# Patient Record
Sex: FEMALE | Race: WHITE | Employment: UNEMPLOYED | URBAN - METROPOLITAN AREA
[De-identification: names, ages, dates, MRNs, and addresses within clinical notes are randomized per-mention and may not be internally consistent; named-entity substitution may affect disease eponyms.]

---

## 2019-09-24 ENCOUNTER — APPOINTMENT (OUTPATIENT)
Dept: CT IMAGING | Facility: CLINIC | Age: 49
End: 2019-09-24
Payer: MEDICARE

## 2019-09-24 ENCOUNTER — HOSPITAL ENCOUNTER (EMERGENCY)
Facility: CLINIC | Age: 49
Discharge: HOME | End: 2019-09-24
Attending: EMERGENCY MEDICINE
Payer: MEDICARE

## 2019-09-24 VITALS
SYSTOLIC BLOOD PRESSURE: 154 MMHG | HEART RATE: 87 BPM | OXYGEN SATURATION: 99 % | DIASTOLIC BLOOD PRESSURE: 80 MMHG | WEIGHT: 165 LBS | TEMPERATURE: 98.3 F | RESPIRATION RATE: 14 BRPM

## 2019-09-24 DIAGNOSIS — R10.32 LEFT LOWER QUADRANT PAIN: Primary | ICD-10-CM

## 2019-09-24 LAB
-: ABNORMAL
ABSOLUTE EOS #: 0.1 K/UL (ref 0–0.4)
ABSOLUTE IMMATURE GRANULOCYTE: NORMAL K/UL (ref 0–0.3)
ABSOLUTE LYMPH #: 1.7 K/UL (ref 1–4.8)
ABSOLUTE MONO #: 0.4 K/UL (ref 0.1–1.2)
AMORPHOUS: ABNORMAL
ANION GAP SERPL CALCULATED.3IONS-SCNC: 12 MMOL/L (ref 9–17)
BACTERIA: ABNORMAL
BASOPHILS # BLD: 2 % (ref 0–2)
BASOPHILS ABSOLUTE: 0.1 K/UL (ref 0–0.2)
BILIRUBIN URINE: NEGATIVE
BUN BLDV-MCNC: 17 MG/DL (ref 6–20)
BUN/CREAT BLD: ABNORMAL (ref 9–20)
CALCIUM SERPL-MCNC: 9.1 MG/DL (ref 8.6–10.4)
CASTS UA: ABNORMAL /LPF (ref 0–2)
CHLORIDE BLD-SCNC: 106 MMOL/L (ref 98–107)
CO2: 23 MMOL/L (ref 20–31)
COLOR: YELLOW
COMMENT UA: ABNORMAL
CREAT SERPL-MCNC: 0.8 MG/DL (ref 0.5–0.9)
CRYSTALS, UA: ABNORMAL /HPF
DIFFERENTIAL TYPE: NORMAL
EOSINOPHILS RELATIVE PERCENT: 2 % (ref 1–4)
EPITHELIAL CELLS UA: ABNORMAL /HPF (ref 0–5)
GFR AFRICAN AMERICAN: >60 ML/MIN
GFR NON-AFRICAN AMERICAN: >60 ML/MIN
GFR SERPL CREATININE-BSD FRML MDRD: ABNORMAL ML/MIN/{1.73_M2}
GFR SERPL CREATININE-BSD FRML MDRD: ABNORMAL ML/MIN/{1.73_M2}
GLUCOSE BLD-MCNC: 120 MG/DL (ref 70–99)
GLUCOSE URINE: NEGATIVE
HCG(URINE) PREGNANCY TEST: NEGATIVE
HCT VFR BLD CALC: 40.8 % (ref 36–46)
HEMOGLOBIN: 13.7 G/DL (ref 12–16)
IMMATURE GRANULOCYTES: NORMAL %
KETONES, URINE: NEGATIVE
LEUKOCYTE ESTERASE, URINE: NEGATIVE
LYMPHOCYTES # BLD: 30 % (ref 24–44)
MCH RBC QN AUTO: 31.2 PG (ref 26–34)
MCHC RBC AUTO-ENTMCNC: 33.5 G/DL (ref 31–37)
MCV RBC AUTO: 93 FL (ref 80–100)
MONOCYTES # BLD: 6 % (ref 2–11)
MUCUS: ABNORMAL
NITRITE, URINE: NEGATIVE
NRBC AUTOMATED: NORMAL PER 100 WBC
OTHER OBSERVATIONS UA: ABNORMAL
PDW BLD-RTO: 13.1 % (ref 12.5–15.4)
PH UA: 5 (ref 5–8)
PLATELET # BLD: 273 K/UL (ref 140–450)
PLATELET ESTIMATE: NORMAL
PMV BLD AUTO: 6.7 FL (ref 6–12)
POTASSIUM SERPL-SCNC: 4 MMOL/L (ref 3.7–5.3)
PROTEIN UA: NEGATIVE
RBC # BLD: 4.39 M/UL (ref 4–5.2)
RBC # BLD: NORMAL 10*6/UL
RBC UA: ABNORMAL /HPF (ref 0–2)
RENAL EPITHELIAL, UA: ABNORMAL /HPF
SEG NEUTROPHILS: 60 % (ref 36–66)
SEGMENTED NEUTROPHILS ABSOLUTE COUNT: 3.4 K/UL (ref 1.8–7.7)
SODIUM BLD-SCNC: 141 MMOL/L (ref 135–144)
SPECIFIC GRAVITY UA: 1.03 (ref 1–1.03)
TRICHOMONAS: ABNORMAL
TURBIDITY: CLEAR
URINE HGB: ABNORMAL
UROBILINOGEN, URINE: NORMAL
WBC # BLD: 5.7 K/UL (ref 3.5–11)
WBC # BLD: NORMAL 10*3/UL
WBC UA: ABNORMAL /HPF (ref 0–5)
YEAST: ABNORMAL

## 2019-09-24 PROCEDURE — 81025 URINE PREGNANCY TEST: CPT

## 2019-09-24 PROCEDURE — 80048 BASIC METABOLIC PNL TOTAL CA: CPT

## 2019-09-24 PROCEDURE — 74176 CT ABD & PELVIS W/O CONTRAST: CPT

## 2019-09-24 PROCEDURE — 96372 THER/PROPH/DIAG INJ SC/IM: CPT

## 2019-09-24 PROCEDURE — 6360000002 HC RX W HCPCS: Performed by: EMERGENCY MEDICINE

## 2019-09-24 PROCEDURE — 99284 EMERGENCY DEPT VISIT MOD MDM: CPT

## 2019-09-24 PROCEDURE — 81001 URINALYSIS AUTO W/SCOPE: CPT

## 2019-09-24 PROCEDURE — 36415 COLL VENOUS BLD VENIPUNCTURE: CPT

## 2019-09-24 PROCEDURE — 85025 COMPLETE CBC W/AUTO DIFF WBC: CPT

## 2019-09-24 RX ORDER — KETOROLAC TROMETHAMINE 30 MG/ML
30 INJECTION, SOLUTION INTRAMUSCULAR; INTRAVENOUS ONCE
Status: COMPLETED | OUTPATIENT
Start: 2019-09-24 | End: 2019-09-24

## 2019-09-24 RX ADMIN — KETOROLAC TROMETHAMINE 30 MG: 30 INJECTION, SOLUTION INTRAMUSCULAR; INTRAVENOUS at 20:11

## 2019-09-24 ASSESSMENT — PAIN DESCRIPTION - LOCATION: LOCATION: ABDOMEN

## 2019-09-24 ASSESSMENT — PAIN DESCRIPTION - ORIENTATION: ORIENTATION: LEFT;LOWER

## 2019-09-24 ASSESSMENT — PAIN SCALES - GENERAL
PAINLEVEL_OUTOF10: 6
PAINLEVEL_OUTOF10: 7

## 2019-09-24 ASSESSMENT — PAIN DESCRIPTION - FREQUENCY: FREQUENCY: CONTINUOUS

## 2019-09-24 ASSESSMENT — PAIN DESCRIPTION - PAIN TYPE: TYPE: ACUTE PAIN

## 2019-09-25 NOTE — ED PROVIDER NOTES
quadrant. No rebound, guarding, masses or bruits. Good active bowel sounds, nonsurgical exam    DIFFERENTIAL DIAGNOSIS/ MDM:     , Bowel obstruction, constipation, kidney stone, diverticulitis, ovarian cyst    DIAGNOSTIC RESULTS       RADIOLOGY:   I directly visualized the following  images and reviewed the radiologist interpretations:  CT ABDOMEN PELVIS WO CONTRAST   Final Result   Limited exam without contrast      No acute abnormality in the abdomen or the pelvis. LABS:  Labs Reviewed   BASIC METABOLIC PANEL - Abnormal; Notable for the following components:       Result Value    Glucose 120 (*)     All other components within normal limits   URINALYSIS - Abnormal; Notable for the following components:    Urine Hgb MODERATE (*)     All other components within normal limits   MICROSCOPIC URINALYSIS - Abnormal; Notable for the following components:    Bacteria, UA FEW (*)     Other Observations UA   (*)     Value: Utilizing a urinalysis as the only screening method to exclude a potential uropathogen can be unreliable in many patient populations. Rapid screening tests are less sensitive than culture and if UTI is a clinical possibility, culture should be considered despite a negative urinalysis. All other components within normal limits   CBC WITH AUTO DIFFERENTIAL   PREGNANCY, URINE         EMERGENCY DEPARTMENT COURSE:   Vitals:    Vitals:    09/24/19 1946   BP: (!) 154/80   Pulse: 87   Resp: 14   Temp: 98.3 °F (36.8 °C)   TempSrc: Oral   SpO2: 99%   Weight: 74.8 kg (165 lb)     -------------------------  BP: (!) 154/80, Temp: 98.3 °F (36.8 °C), Pulse: 87, Resp: 14    Orders Placed This Encounter   Medications    ketorolac (TORADOL) injection 30 mg           Re-evaluation Notes    Labs, urine and CT are unremarkable. Patient is reevaluated. She still remained with a nonsurgical abdomen at this time. She will be discharged with early follow-up.   Return if worse        FINAL IMPRESSION      1.

## 2020-07-02 ENCOUNTER — HOSPITAL ENCOUNTER (EMERGENCY)
Facility: CLINIC | Age: 50
Discharge: HOME | End: 2020-07-02
Attending: EMERGENCY MEDICINE

## 2020-07-02 ENCOUNTER — APPOINTMENT (OUTPATIENT)
Dept: GENERAL RADIOLOGY | Facility: CLINIC | Age: 50
End: 2020-07-02

## 2020-07-02 VITALS
BODY MASS INDEX: 27.32 KG/M2 | HEIGHT: 66 IN | HEART RATE: 70 BPM | OXYGEN SATURATION: 100 % | RESPIRATION RATE: 16 BRPM | TEMPERATURE: 97.6 F | WEIGHT: 170 LBS | DIASTOLIC BLOOD PRESSURE: 77 MMHG | SYSTOLIC BLOOD PRESSURE: 116 MMHG

## 2020-07-02 PROCEDURE — 73610 X-RAY EXAM OF ANKLE: CPT

## 2020-07-02 PROCEDURE — 73630 X-RAY EXAM OF FOOT: CPT

## 2020-07-02 PROCEDURE — 6370000000 HC RX 637 (ALT 250 FOR IP): Performed by: EMERGENCY MEDICINE

## 2020-07-02 PROCEDURE — 99284 EMERGENCY DEPT VISIT MOD MDM: CPT

## 2020-07-02 RX ORDER — OXYCODONE HYDROCHLORIDE AND ACETAMINOPHEN 5; 325 MG/1; MG/1
1 TABLET ORAL EVERY 6 HOURS PRN
Qty: 12 TABLET | Refills: 0 | Status: SHIPPED | OUTPATIENT
Start: 2020-07-02 | End: 2020-07-05

## 2020-07-02 RX ORDER — OXYCODONE HYDROCHLORIDE AND ACETAMINOPHEN 5; 325 MG/1; MG/1
2 TABLET ORAL ONCE
Status: COMPLETED | OUTPATIENT
Start: 2020-07-02 | End: 2020-07-02

## 2020-07-02 RX ADMIN — OXYCODONE AND ACETAMINOPHEN 2 TABLET: 5; 325 TABLET ORAL at 17:33

## 2020-07-02 ASSESSMENT — PAIN DESCRIPTION - ORIENTATION: ORIENTATION: RIGHT

## 2020-07-02 ASSESSMENT — PAIN DESCRIPTION - PAIN TYPE: TYPE: ACUTE PAIN

## 2020-07-02 ASSESSMENT — PAIN SCALES - GENERAL
PAINLEVEL_OUTOF10: 7
PAINLEVEL_OUTOF10: 10
PAINLEVEL_OUTOF10: 9

## 2020-07-02 ASSESSMENT — PAIN DESCRIPTION - LOCATION
LOCATION: ANKLE;FOOT
LOCATION: ANKLE

## 2020-07-02 ASSESSMENT — PAIN DESCRIPTION - FREQUENCY: FREQUENCY: CONTINUOUS

## 2020-07-02 ASSESSMENT — PAIN DESCRIPTION - PROGRESSION
CLINICAL_PROGRESSION: GRADUALLY IMPROVING
CLINICAL_PROGRESSION: NOT CHANGED

## 2020-07-02 ASSESSMENT — PAIN DESCRIPTION - ONSET: ONSET: SUDDEN

## 2020-07-02 ASSESSMENT — PAIN DESCRIPTION - DESCRIPTORS: DESCRIPTORS: CONSTANT;SHARP;THROBBING

## 2020-07-02 NOTE — ED PROVIDER NOTES
eye: No discharge. Left eye: No discharge. Conjunctiva/sclera: Conjunctivae normal.   Neck:      Musculoskeletal: Normal range of motion. Cardiovascular:      Rate and Rhythm: Normal rate and regular rhythm. Pulses: Normal pulses. Heart sounds: Normal heart sounds. No murmur. Pulmonary:      Effort: Pulmonary effort is normal. No respiratory distress. Abdominal:      General: There is no distension. Musculoskeletal: Normal range of motion. General: Swelling, tenderness and signs of injury present. No deformity. Comments: Injury at the medial right ankle. Good pulse in the right foot. Slightly limited range of motion because of pain   Skin:     General: Skin is warm and dry. Capillary Refill: Capillary refill takes less than 2 seconds. Findings: No rash. Neurological:      General: No focal deficit present. Mental Status: She is alert and oriented to person, place, and time. Mental status is at baseline. Sensory: No sensory deficit. Motor: No weakness. Comments: Speaking normally. No facial asymmetry. Moving all 4 extremities. Normal gait. Normal strength and sensation right foot   Psychiatric:         Mood and Affect: Mood normal.         EMERGENCY DEPARTMENT COURSE and DIFFERENTIAL DIAGNOSIS/MDM:   Vitals:    Vitals:    07/02/20 1720   BP: 116/77   Pulse: 70   Resp: 16   Temp: 97.6 °F (36.4 °C)   TempSrc: Oral   SpO2: 100%   Weight: 77.1 kg (170 lb)   Height: 5' 6\" (1.676 m)       Presents to the emergency department with an ankle injury as described. Vitals are grossly normal and she is nontoxic. Physical exam reveals some tenderness and swelling, I am going to obtain x-ray, I will give pain meds and I will reevaluate      DIAGNOSTIC RESULTS     LABS:  Labs Reviewed - No data to display    All other labs were within normal range or not returned as of this dictation.     RADIOLOGY:  XR FOOT RIGHT (MIN 3 VIEWS)   Preliminary Result Normal right foot alignment with no acute fracture. XR ANKLE RIGHT (MIN 3 VIEWS)   Final Result   Mild soft tissue swelling questioned inferiorly in the medial right ankle. No fracture. ED Course as of Jul 02 1830   Thu Jul 02, 2020 1826 Patient's initial ankle x-ray did not reveal any fracture dislocation but I did have some concern that there could be a foot fracture so I added on that x-ray. That also came back negative. I am going to order crutches and an Aircast for her, I recommended rest, ice and elevation and I will give short course of pain medication. At this time the patient is without objective evidence of an acute process requiring hospitalization or inpatient management. They have remained hemodynamically stable and are stable for discharge with outpatient follow-up. Standard anticipatory guidance given to patient upon discharge. Have given them a specific time frame in which to follow-up and who to follow-up with. I have also advised them that they should return to the emergency department if they get worse, or not getting better or develop any new or concerning symptoms. Patient demonstrates understanding.    [TS]      ED Course User Index  [TS] Samy Beckett DO         PROCEDURES:  Unless otherwise noted below, none     Procedures    FINAL IMPRESSION      1. Moderate right ankle sprain, initial encounter          DISPOSITION/PLAN   DISPOSITION Decision To Discharge 07/02/2020 06:27:26 PM      PATIENT REFERRED TO:  Celia Mohan MD  [address] [place], [place] [address] [phone]    In 1 week        DISCHARGE MEDICATIONS:  New Prescriptions    OXYCODONE-ACETAMINOPHEN (PERCOCET) 5-325 MG PER TABLET    Take 1 tablet by mouth every 6 hours as needed for Pain for up to 3 days. Intended supply: 3 days.  Take lowest dose possible to manage pain          (Please note that portions of this note were completed with a voice recognition program.  Efforts were made to edit the dictations but occasionally words are mis-transcribed.)    Donnie Baird DO (electronically signed)  Board Certified Emergency Physician          Donnie Baird DO  07/02/20 8371

## 2020-07-06 ENCOUNTER — TELEPHONE (OUTPATIENT)
Dept: ORTHOPEDIC SURGERY | Age: 50
End: 2020-07-06

## 2020-10-06 ENCOUNTER — HOSPITAL ENCOUNTER (EMERGENCY)
Facility: CLINIC | Age: 50
Discharge: HOME | End: 2020-10-06
Attending: EMERGENCY MEDICINE
Payer: MEDICAID

## 2020-10-06 VITALS
DIASTOLIC BLOOD PRESSURE: 94 MMHG | TEMPERATURE: 98.8 F | WEIGHT: 168 LBS | BODY MASS INDEX: 27 KG/M2 | RESPIRATION RATE: 16 BRPM | OXYGEN SATURATION: 98 % | SYSTOLIC BLOOD PRESSURE: 127 MMHG | HEART RATE: 77 BPM | HEIGHT: 66 IN

## 2020-10-06 PROCEDURE — 99283 EMERGENCY DEPT VISIT LOW MDM: CPT

## 2020-10-06 PROCEDURE — 99282 EMERGENCY DEPT VISIT SF MDM: CPT

## 2020-10-06 PROCEDURE — 6370000000 HC RX 637 (ALT 250 FOR IP): Performed by: EMERGENCY MEDICINE

## 2020-10-06 RX ORDER — AMOXICILLIN AND CLAVULANATE POTASSIUM 875; 125 MG/1; MG/1
1 TABLET, FILM COATED ORAL 2 TIMES DAILY
Qty: 20 TABLET | Refills: 0 | Status: SHIPPED | OUTPATIENT
Start: 2020-10-06 | End: 2020-10-16

## 2020-10-06 RX ORDER — TETRACAINE HYDROCHLORIDE 5 MG/ML
1 SOLUTION OPHTHALMIC ONCE
Status: COMPLETED | OUTPATIENT
Start: 2020-10-06 | End: 2020-10-06

## 2020-10-06 RX ADMIN — TETRACAINE HYDROCHLORIDE 1 DROP: 5 SOLUTION OPHTHALMIC at 18:50

## 2020-10-06 RX ADMIN — FLUORESCEIN SODIUM 1 MG: 1 STRIP OPHTHALMIC at 18:50

## 2020-10-06 ASSESSMENT — ENCOUNTER SYMPTOMS: EYE DISCHARGE: 1

## 2020-10-06 ASSESSMENT — VISUAL ACUITY: OU: 1

## 2020-10-06 ASSESSMENT — PAIN SCALES - GENERAL: PAINLEVEL_OUTOF10: 7

## 2020-10-06 NOTE — ED PROVIDER NOTES
[facility]  [facility] [place]  Phone: [facility]  EMERGENCY DEPARTMENT ENCOUNTER      Pt Name: Marcelina Lucio  MRN: [id number]  [place] 1970  Date of evaluation: 10/6/2020  Provider: Vadim Cruz DO    CHIEF COMPLAINT       Chief Complaint   Patient presents with    Eye Pain    Shoulder Pain         HISTORY OF PRESENT ILLNESS   (Location/Symptom, Timing/Onset,Context/Setting, Quality, Duration, Modifying Factors, Severity)  Note limiting factors. Marcelina Lucio is a 53 y.o. female who presents to the emergency department for the evaluation of pain around her right eye. Patient states she had a visit there a couple of days ago that she tried to pop and she got some stuff out but then it became red and now the upper portion is swollen. She has no change in her vision and no pain in the globe. She states the eye was swollen shut yesterday. She does also occasionally get some left shoulder pain and she states that when it hurts her left arm gets numb. This was happening the past couple of days. Not currently present. Patient does not wear contacts. She denies injuries    Nursing Notes were reviewed. REVIEW OF SYSTEMS    (2-9systems for level 4, 10 or more for level 5)     Review of Systems   Constitutional: Negative for fever. Eyes: Positive for discharge. Musculoskeletal:        Left shoulder pain   Neurological: Positive for numbness. Except asnoted above the remainder of the review of systems was reviewed and negative.        PAST MEDICAL HISTORY     Past Medical History:   Diagnosis Date    Ovarian cyst          SURGICAL HISTORY       Past Surgical History:   Procedure Laterality Date    DILATION AND CURETTAGE OF UTERUS      ECTOPIC PREGNANCY SURGERY      KNEE SURGERY      STERNUM FRACTURE SURGERY      TONSILLECTOMY      WRIST SURGERY           CURRENT MEDICATIONS     Previous Medications    No medications on file ALLERGIES     Patient has no known allergies. FAMILY HISTORY     History reviewed. No pertinent family history. SOCIAL HISTORY       Social History     Socioeconomic History    Marital status: Single     Spouse name: None    Number of children: None    Years of education: None    Highest education level: None   Occupational History    None   Social Needs    Financial resource strain: None    Food insecurity     Worry: None     Inability: None    Transportation needs     Medical: None     Non-medical: None   Tobacco Use    Smoking status: Current Some Day Smoker    Smokeless tobacco: Never Used   Substance and Sexual Activity    Alcohol use: Never    Drug use: Never    Sexual activity: Never   Lifestyle    Physical activity     Days per week: None     Minutes per session: None    Stress: None   Relationships    Social connections     Talks on phone: None     Gets together: None     Attends Hindu service: None     Active member of club or organization: None     Attends meetings of clubs or organizations: None     Relationship status: None    Intimate partner violence     Fear of current or ex partner: None     Emotionally abused: None     Physically abused: None     Forced sexual activity: None   Other Topics Concern    None   Social History Narrative    None       SCREENINGS             PHYSICAL EXAM    (up to 7 for level 4, 8 or more for level 5)     ED Triage Vitals [10/06/20 1800]   BP Temp Temp Source Pulse Resp SpO2 Height Weight   (!) 127/94 98.8 °F (37.1 °C) Oral 77 16 98 % 5' 6\" (1.676 m) 168 lb (76.2 kg)       Physical Exam  Vitals signs and nursing note reviewed. Constitutional:       General: She is not in acute distress. Appearance: Normal appearance. She is not ill-appearing or toxic-appearing. HENT:      Head: Normocephalic and atraumatic. Nose: Nose normal. No congestion.       Mouth/Throat:      Mouth: Mucous membranes are moist.   Eyes:      General: Vision grossly intact. No visual field deficit. Right eye: Discharge present. No foreign body or hordeolum. Left eye: No foreign body, discharge or hordeolum. Extraocular Movements:      Right eye: Normal extraocular motion. Left eye: Normal extraocular motion. Conjunctiva/sclera: Conjunctivae normal.      Right eye: Right conjunctiva is not injected. Exudate present. No chemosis or hemorrhage. Left eye: Left conjunctiva is not injected. No chemosis, exudate or hemorrhage. Pupils: Pupils are equal, round, and reactive to light. Right eye: No corneal abrasion or fluorescein uptake. Ellen exam negative. Left eye: No corneal abrasion or fluorescein uptake. Ellen exam negative. Comments: Right upper lid is swollen and there is a small area that I have highlighted with the picture diagram that is indurated and looks like it was a possible small abscess. Under fluorescein staining with [place] Maria Eugenia lamp light, I did not see any evidence of dendritic lesions on the right cornea   Neck:      Musculoskeletal: Normal range of motion. Cardiovascular:      Rate and Rhythm: Normal rate and regular rhythm. Pulses: Normal pulses. Heart sounds: Normal heart sounds. No murmur. Pulmonary:      Effort: Pulmonary effort is normal. No respiratory distress. Breath sounds: Normal breath sounds. No wheezing. Abdominal:      General: Abdomen is flat. There is no distension. Palpations: Abdomen is soft. Tenderness: There is no abdominal tenderness. Musculoskeletal: Normal range of motion. General: No tenderness, deformity or signs of injury. Skin:     General: Skin is warm and dry. Capillary Refill: Capillary refill takes less than 2 seconds. Findings: No rash. Neurological:      General: No focal deficit present. Mental Status: She is alert and oriented to person, place, and time. Mental status is at baseline.       Sensory: No sensory deficit. Motor: No weakness. Comments: Speaking normally. No facial asymmetry. Moving all 4 extremities. Normal gait. Normal strength and sensation in left upper extremity   Psychiatric:         Mood and Affect: Mood normal.         EMERGENCY DEPARTMENT COURSE and DIFFERENTIAL DIAGNOSIS/MDM:   Vitals:    Vitals:    10/06/20 1800   BP: (!) 127/94   Pulse: 77   Resp: 16   Temp: 98.8 °F (37.1 °C)   TempSrc: Oral   SpO2: 98%   Weight: 76.2 kg (168 lb)   Height: 5' 6\" (1.676 m)       Patient presents to the emergency department with the complaint described above. Vitals are grossly normal and she is nontoxic. Physical exam reveals evidence of some periorbital cellulitis. One thought I had was that this could actually be shingles but I did not see any dendritic lesions on the eye and there is a negative Garrett sign. At this time, I am treating her for periorbital cellulitis and I talked about the importance of PCP follow-up to establish primary care for her left shoulder issues, I do think at her age she needs to have a primary care doctor and probably get her cholesterol and hemoglobin A1c checked. I also gave her information for optometry follow-up    At this time the patient is without objective evidence of an acute process requiring hospitalization or inpatient management. They have remained hemodynamically stable and are stable for discharge with outpatient follow-up. Standard anticipatory guidance given to patient upon discharge. Have given them a specific time frame in which to follow-up and who to follow-up with. I have also advised them that they should return to the emergency department if they get worse, or not getting better or develop any new or concerning symptoms. Patient demonstrates understanding. PROCEDURES:  Unless otherwise noted below, none     Procedures    FINAL IMPRESSION      1.  Periorbital cellulitis of right eye          DISPOSITION/PLAN   DISPOSITION Decision To Discharge 10/06/2020 06:23:58 PM      PATIENT REFERRED TO:  Establish Primary doctor  If you do not have a primary care physician or you are looking for a new physician, please contact the following number [phone] to establish one.         [address] [place] [address] [phone]  In 3 days        DISCHARGE MEDICATIONS:  New Prescriptions    AMOXICILLIN-CLAVULANATE (AUGMENTIN) 875-125 MG PER TABLET    Take 1 tablet by mouth 2 times daily for 10 days          (Please note that portions of this note were completed with a voice recognition program.  Efforts were made to edit the dictations but occasionally words are mis-transcribed.)    Calista Abrams DO (electronically signed)  Board Certified Emergency Physician          Calista Abrams DO  10/06/20 8468

## 2021-01-22 ENCOUNTER — HOSPITAL ENCOUNTER (EMERGENCY)
Facility: CLINIC | Age: 51
Discharge: HOME | End: 2021-01-22
Attending: EMERGENCY MEDICINE
Payer: COMMERCIAL

## 2021-01-22 VITALS
OXYGEN SATURATION: 97 % | BODY MASS INDEX: 25.71 KG/M2 | WEIGHT: 160 LBS | SYSTOLIC BLOOD PRESSURE: 117 MMHG | DIASTOLIC BLOOD PRESSURE: 81 MMHG | RESPIRATION RATE: 18 BRPM | HEART RATE: 77 BPM | TEMPERATURE: 97.9 F | HEIGHT: 66 IN

## 2021-01-22 DIAGNOSIS — M25.511 ACUTE PAIN OF BOTH SHOULDERS: Primary | ICD-10-CM

## 2021-01-22 DIAGNOSIS — M25.512 ACUTE PAIN OF BOTH SHOULDERS: Primary | ICD-10-CM

## 2021-01-22 PROCEDURE — 96372 THER/PROPH/DIAG INJ SC/IM: CPT

## 2021-01-22 PROCEDURE — 99282 EMERGENCY DEPT VISIT SF MDM: CPT

## 2021-01-22 PROCEDURE — 6360000002 HC RX W HCPCS: Performed by: EMERGENCY MEDICINE

## 2021-01-22 RX ORDER — KETOROLAC TROMETHAMINE 30 MG/ML
30 INJECTION, SOLUTION INTRAMUSCULAR; INTRAVENOUS ONCE
Status: COMPLETED | OUTPATIENT
Start: 2021-01-22 | End: 2021-01-22

## 2021-01-22 RX ORDER — ORPHENADRINE CITRATE 30 MG/ML
60 INJECTION INTRAMUSCULAR; INTRAVENOUS ONCE
Status: COMPLETED | OUTPATIENT
Start: 2021-01-22 | End: 2021-01-22

## 2021-01-22 RX ORDER — IBUPROFEN 800 MG/1
800 TABLET ORAL EVERY 8 HOURS PRN
Qty: 30 TABLET | Refills: 0 | Status: SHIPPED | OUTPATIENT
Start: 2021-01-22 | End: 2021-08-09

## 2021-01-22 RX ADMIN — KETOROLAC TROMETHAMINE 30 MG: 30 INJECTION, SOLUTION INTRAMUSCULAR at 22:28

## 2021-01-22 RX ADMIN — ORPHENADRINE CITRATE 60 MG: 60 INJECTION INTRAMUSCULAR; INTRAVENOUS at 22:28

## 2021-01-22 ASSESSMENT — PAIN SCALES - GENERAL
PAINLEVEL_OUTOF10: 10
PAINLEVEL_OUTOF10: 10

## 2021-01-22 ASSESSMENT — PAIN DESCRIPTION - DESCRIPTORS: DESCRIPTORS: STABBING

## 2021-01-22 ASSESSMENT — PAIN DESCRIPTION - FREQUENCY: FREQUENCY: CONTINUOUS

## 2021-01-23 NOTE — ED PROVIDER NOTES
eMERGENCY dEPARTMENT eNCOUnter      Pt Name: Colt Ross  MRN: [id number]  [place] 1970  Date of evaluation: 1/22/2021      CHIEF COMPLAINT       Chief Complaint   Patient presents with    Shoulder Pain     right    Back Pain     lower         HISTORY OF PRESENT ILLNESS    Colt Ross is a 53 y.o. female who presents shoulder and back pain. Patient states she has a history of chronic back pain yesterday she went to trip caught herself ronni her shoulder she states she has pain in the lower back she states she has pain in her right shoulder since yesterday now she start in the left she denies any numbness tingling weakness any direct trauma to the area or any head neck injury has not taken anything for pain        REVIEW OF SYSTEMS       Review of systems are all reviewed and negative except stated above in HPI    [address]    has a past medical history of Ovarian cyst.    SURGICAL HISTORY      has a past surgical history that includes knee surgery; Wrist surgery; Tonsillectomy; Dilation and curettage of uterus; Ectopic pregnancy surgery; and Sternum fracture surgery. CURRENT MEDICATIONS       Previous Medications    No medications on file       ALLERGIES     has No Known Allergies. FAMILY HISTORY     has no family status information on file. family history is not on file. SOCIAL HISTORY      reports that she has been smoking cigarettes. She has never used smokeless tobacco. She reports that she does not drink alcohol or use drugs. PHYSICAL EXAM     INITIAL VITALS:  height is 5' 6\" (1.676 m) and weight is 72.6 kg (160 lb). Her oral temperature is 97.9 °F (36.6 °C). Her blood pressure is 117/81 and her pulse is 77. Her respiration is 18 and oxygen saturation is 97%.       General: Patient is alert in no acute distress  HEENT: Head is atraumatic  Neck: Supple no C-spine tenderness step-offs or deformities  Back: Patient has mild diffuse tenderness in the lumbar region with no step-offs or deformities an old well-healed surgical scar  Respiratory: Lung sounds are clear bilateral  Cardiac: Heart is regular rate and rhythm  Neuro: Patient has no gross focal neurological deficits at bedside exam  Extremity: Examination of the upper extremities reveals no gross deformity swelling or ecchymosis she has full range of motion neurovascularly intact distally    DIFFERENTIAL DIAGNOSIS/ MDM:     Shoulder pain    DIAGNOSTIC RESULTS       RADIOLOGY:   I directly visualized the following  images and reviewed the radiologist interpretations:  No orders to display         LABS:  Labs Reviewed - No data to display      EMERGENCY DEPARTMENT COURSE:   Vitals:    Vitals:    01/22/21 2202   BP: 117/81   Pulse: 77   Resp: 18   Temp: 97.9 °F (36.6 °C)   TempSrc: Oral   SpO2: 97%   Weight: 72.6 kg (160 lb)   Height: 5' 6\" (1.676 m)     -------------------------  BP: 117/81, Temp: 97.9 °F (36.6 °C), Pulse: 77, Resp: 18    Orders Placed This Encounter   Medications    ketorolac (TORADOL) injection 30 mg    orphenadrine (NORFLEX) injection 60 mg    ibuprofen (ADVIL;MOTRIN) 800 MG tablet     Sig: Take 1 tablet by mouth every 8 hours as needed for Pain     Dispense:  30 tablet     Refill:  0           Re-evaluation Notes    Patient has a normal exam with no gross deformities no reproducible bony tenderness neurovascularly intact she states that she \"ronni\" herself at this point I will treat her with some Toradol and Norflex prescription for Motrin follow-up with Ortho return if worse        FINAL IMPRESSION      1.  Acute pain of both shoulders          DISPOSITION/PLAN   DISPOSITION Decision To Discharge 01/22/2021 10:17:13 PM      Condition on Disposition    Stable    PATIENT REFERRED TO:  Gabrielle Ernst MD  [address] [place] [address]  [phone]    In 2 days        DISCHARGE MEDICATIONS:  New Prescriptions    IBUPROFEN (ADVIL;MOTRIN) 800 MG TABLET    Take 1 tablet by mouth every 8 hours as needed for Pain       (Please note that portions of this note were completed with a voice recognition program.  Efforts were made to edit the dictations but occasionally words are mis-transcribed.)    Rodriguez MD, F.A.C.E.P.   Attending Emergency Physician        James Bright MD  01/22/21 0099

## 2021-01-23 NOTE — ED NOTES
Mode of arrival (squad #, walk in, police, etc) : walk in from home        Chief complaint(s): left lower back pain, right shoulder pain        Arrival Note (brief scenario, treatment PTA, etc). : Pt presented to the ED c/o left lower back pain and right shoulder pain that started yesterday. Pt states she slipped yesterday, but denies falling. Pt states she is unsure if her had hurt herself in the process. Pt states the pain feels like a stabbing pain, with the right shoulder pain shooting through to her chest. Pt denies taking any pain meds prior to her arrival in the ED. Pt denies numbness and tingling in her arms. Pt has full range of motion with good pulses and strength in arms bilat. Pt states she has pins and rods in her back from previous surgeries, but states \"they are broken\". No obvious deformities noted at this time. C= \"Have you ever felt that you should Cut down on your drinking? \"  No  A= \"Have people Annoyed you by criticizing your drinking? \"  No  G= \"Have you ever felt bad or Guilty about your drinking? \"  No  E= \"Have you ever had a drink as an Eye-opener first thing in the morning to steady your nerves or to help a hangover? \"  No      Deferred []      Reason for deferring: N/A    *If yes to two or more: probable alcohol abuse. Andra Soulier, RN  01/22/21 2242

## 2021-08-09 ENCOUNTER — APPOINTMENT (OUTPATIENT)
Dept: CT IMAGING | Facility: CLINIC | Age: 51
End: 2021-08-09
Payer: COMMERCIAL

## 2021-08-09 ENCOUNTER — HOSPITAL ENCOUNTER (EMERGENCY)
Facility: CLINIC | Age: 51
Discharge: HOME | End: 2021-08-10
Attending: EMERGENCY MEDICINE
Payer: COMMERCIAL

## 2021-08-09 VITALS
HEART RATE: 66 BPM | DIASTOLIC BLOOD PRESSURE: 92 MMHG | HEIGHT: 66 IN | TEMPERATURE: 98.8 F | RESPIRATION RATE: 19 BRPM | BODY MASS INDEX: 27.64 KG/M2 | OXYGEN SATURATION: 98 % | WEIGHT: 172 LBS | SYSTOLIC BLOOD PRESSURE: 134 MMHG

## 2021-08-09 DIAGNOSIS — N76.0 BACTERIAL VAGINOSIS: ICD-10-CM

## 2021-08-09 DIAGNOSIS — G43.909 MIGRAINE WITHOUT STATUS MIGRAINOSUS, NOT INTRACTABLE, UNSPECIFIED MIGRAINE TYPE: ICD-10-CM

## 2021-08-09 DIAGNOSIS — S09.90XA CLOSED HEAD INJURY, INITIAL ENCOUNTER: Primary | ICD-10-CM

## 2021-08-09 DIAGNOSIS — B96.89 BACTERIAL VAGINOSIS: ICD-10-CM

## 2021-08-09 LAB
DIRECT EXAM: ABNORMAL
Lab: ABNORMAL
SPECIMEN DESCRIPTION: ABNORMAL

## 2021-08-09 PROCEDURE — 6360000002 HC RX W HCPCS: Performed by: EMERGENCY MEDICINE

## 2021-08-09 PROCEDURE — 96372 THER/PROPH/DIAG INJ SC/IM: CPT

## 2021-08-09 PROCEDURE — 87480 CANDIDA DNA DIR PROBE: CPT

## 2021-08-09 PROCEDURE — 72125 CT NECK SPINE W/O DYE: CPT

## 2021-08-09 PROCEDURE — 87491 CHLMYD TRACH DNA AMP PROBE: CPT

## 2021-08-09 PROCEDURE — 87660 TRICHOMONAS VAGIN DIR PROBE: CPT

## 2021-08-09 PROCEDURE — 87510 GARDNER VAG DNA DIR PROBE: CPT

## 2021-08-09 PROCEDURE — 87591 N.GONORRHOEAE DNA AMP PROB: CPT

## 2021-08-09 PROCEDURE — 99282 EMERGENCY DEPT VISIT SF MDM: CPT

## 2021-08-09 PROCEDURE — 6370000000 HC RX 637 (ALT 250 FOR IP): Performed by: EMERGENCY MEDICINE

## 2021-08-09 PROCEDURE — 70450 CT HEAD/BRAIN W/O DYE: CPT

## 2021-08-09 RX ORDER — METRONIDAZOLE 500 MG/1
500 TABLET ORAL 2 TIMES DAILY
Qty: 14 TABLET | Refills: 0 | Status: SHIPPED | OUTPATIENT
Start: 2021-08-09 | End: 2021-08-16

## 2021-08-09 RX ORDER — KETOROLAC TROMETHAMINE 30 MG/ML
30 INJECTION, SOLUTION INTRAMUSCULAR; INTRAVENOUS ONCE
Status: COMPLETED | OUTPATIENT
Start: 2021-08-09 | End: 2021-08-09

## 2021-08-09 RX ORDER — ORPHENADRINE CITRATE 30 MG/ML
60 INJECTION INTRAMUSCULAR; INTRAVENOUS ONCE
Status: COMPLETED | OUTPATIENT
Start: 2021-08-09 | End: 2021-08-09

## 2021-08-09 RX ORDER — ONDANSETRON 4 MG/1
4 TABLET, ORALLY DISINTEGRATING ORAL EVERY 8 HOURS PRN
Qty: 10 TABLET | Refills: 0 | Status: SHIPPED | OUTPATIENT
Start: 2021-08-09 | End: 2021-11-14

## 2021-08-09 RX ORDER — METOCLOPRAMIDE HYDROCHLORIDE 5 MG/ML
10 INJECTION INTRAMUSCULAR; INTRAVENOUS ONCE
Status: COMPLETED | OUTPATIENT
Start: 2021-08-09 | End: 2021-08-09

## 2021-08-09 RX ORDER — METRONIDAZOLE 500 MG/1
500 TABLET ORAL ONCE
Status: COMPLETED | OUTPATIENT
Start: 2021-08-09 | End: 2021-08-09

## 2021-08-09 RX ORDER — DIPHENHYDRAMINE HYDROCHLORIDE 50 MG/ML
25 INJECTION INTRAMUSCULAR; INTRAVENOUS ONCE
Status: COMPLETED | OUTPATIENT
Start: 2021-08-09 | End: 2021-08-09

## 2021-08-09 RX ORDER — DEXAMETHASONE SODIUM PHOSPHATE 10 MG/ML
8 INJECTION, SOLUTION INTRAMUSCULAR; INTRAVENOUS ONCE
Status: COMPLETED | OUTPATIENT
Start: 2021-08-09 | End: 2021-08-09

## 2021-08-09 RX ADMIN — METRONIDAZOLE 500 MG: 500 TABLET ORAL at 23:40

## 2021-08-09 RX ADMIN — KETOROLAC TROMETHAMINE 30 MG: 30 INJECTION, SOLUTION INTRAMUSCULAR; INTRAVENOUS at 23:40

## 2021-08-09 RX ADMIN — ORPHENADRINE CITRATE 60 MG: 30 INJECTION INTRAMUSCULAR; INTRAVENOUS at 23:40

## 2021-08-09 RX ADMIN — DEXAMETHASONE SODIUM PHOSPHATE 8 MG: 10 INJECTION, SOLUTION INTRAMUSCULAR; INTRAVENOUS at 21:37

## 2021-08-09 RX ADMIN — DIPHENHYDRAMINE HYDROCHLORIDE 25 MG: 50 INJECTION, SOLUTION INTRAMUSCULAR; INTRAVENOUS at 21:37

## 2021-08-09 RX ADMIN — METOCLOPRAMIDE 10 MG: 5 INJECTION, SOLUTION INTRAMUSCULAR; INTRAVENOUS at 21:37

## 2021-08-09 ASSESSMENT — PAIN SCALES - GENERAL
PAINLEVEL_OUTOF10: 10
PAINLEVEL_OUTOF10: 7
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
## 2021-08-10 NOTE — ED NOTES
Dr. Dominique at bedside to perform pelvic exam. Pt tolerated procedure well. Specimen collected and walked to lab.       Babs Mccracken RN  08/09/21 1790

## 2021-08-12 LAB
C TRACH DNA GENITAL QL NAA+PROBE: NEGATIVE
N. GONORRHOEAE DNA: NEGATIVE
SPECIMEN DESCRIPTION: NORMAL

## 2021-10-28 ENCOUNTER — HOSPITAL ENCOUNTER (EMERGENCY)
Facility: CLINIC | Age: 51
Discharge: HOME | End: 2021-10-28
Attending: EMERGENCY MEDICINE
Payer: COMMERCIAL

## 2021-10-28 VITALS
BODY MASS INDEX: 26.52 KG/M2 | SYSTOLIC BLOOD PRESSURE: 139 MMHG | TEMPERATURE: 98.3 F | HEART RATE: 87 BPM | WEIGHT: 165 LBS | DIASTOLIC BLOOD PRESSURE: 97 MMHG | HEIGHT: 66 IN | OXYGEN SATURATION: 100 % | RESPIRATION RATE: 20 BRPM

## 2021-10-28 DIAGNOSIS — T81.49XA WOUND INFECTION AFTER SURGERY: Primary | ICD-10-CM

## 2021-10-28 PROCEDURE — 99283 EMERGENCY DEPT VISIT LOW MDM: CPT

## 2021-10-28 PROCEDURE — 6370000000 HC RX 637 (ALT 250 FOR IP): Performed by: EMERGENCY MEDICINE

## 2021-10-28 RX ORDER — CEPHALEXIN 250 MG/1
500 CAPSULE ORAL ONCE
Status: COMPLETED | OUTPATIENT
Start: 2021-10-28 | End: 2021-10-28

## 2021-10-28 RX ORDER — DOXYCYCLINE 100 MG/1
100 TABLET ORAL 2 TIMES DAILY
Qty: 20 TABLET | Refills: 0 | Status: SHIPPED | OUTPATIENT
Start: 2021-10-28 | End: 2021-11-07

## 2021-10-28 RX ORDER — HYDROCODONE BITARTRATE AND ACETAMINOPHEN 5; 325 MG/1; MG/1
1 TABLET ORAL 3 TIMES DAILY PRN
Qty: 6 TABLET | Refills: 0 | Status: SHIPPED | OUTPATIENT
Start: 2021-10-28 | End: 2021-10-30

## 2021-10-28 RX ORDER — DOXYCYCLINE 100 MG/1
100 CAPSULE ORAL ONCE
Status: COMPLETED | OUTPATIENT
Start: 2021-10-28 | End: 2021-10-28

## 2021-10-28 RX ORDER — CEPHALEXIN 500 MG/1
500 CAPSULE ORAL 3 TIMES DAILY
Qty: 30 CAPSULE | Refills: 0 | Status: SHIPPED | OUTPATIENT
Start: 2021-10-28 | End: 2021-11-14

## 2021-10-28 RX ORDER — HYDROCODONE BITARTRATE AND ACETAMINOPHEN 5; 325 MG/1; MG/1
1 TABLET ORAL ONCE
Status: COMPLETED | OUTPATIENT
Start: 2021-10-28 | End: 2021-10-28

## 2021-10-28 RX ADMIN — CEPHALEXIN 500 MG: 250 CAPSULE ORAL at 09:09

## 2021-10-28 RX ADMIN — HYDROCODONE BITARTRATE AND ACETAMINOPHEN 1 TABLET: 5; 325 TABLET ORAL at 09:09

## 2021-10-28 RX ADMIN — DOXYCYCLINE 100 MG: 100 CAPSULE ORAL at 09:09

## 2021-10-28 ASSESSMENT — PAIN DESCRIPTION - DESCRIPTORS: DESCRIPTORS: ACHING

## 2021-10-28 ASSESSMENT — ENCOUNTER SYMPTOMS: SHORTNESS OF BREATH: 0

## 2021-10-28 ASSESSMENT — PAIN SCALES - GENERAL
PAINLEVEL_OUTOF10: 10
PAINLEVEL_OUTOF10: 10

## 2021-10-28 ASSESSMENT — PAIN DESCRIPTION - PAIN TYPE: TYPE: ACUTE PAIN

## 2021-10-28 ASSESSMENT — PAIN DESCRIPTION - ORIENTATION: ORIENTATION: LEFT

## 2021-10-28 ASSESSMENT — PAIN DESCRIPTION - LOCATION: LOCATION: ARM

## 2021-10-28 ASSESSMENT — PAIN DESCRIPTION - ONSET: ONSET: ON-GOING

## 2021-10-28 ASSESSMENT — PAIN DESCRIPTION - FREQUENCY: FREQUENCY: CONTINUOUS

## 2021-10-28 ASSESSMENT — PAIN DESCRIPTION - PROGRESSION: CLINICAL_PROGRESSION: NOT CHANGED

## 2021-10-28 NOTE — ED NOTES
Patient to ED via self ambulatory to room 5  Patient here for complaint of left arm/elbow pain  Patient states she recently had carpal tunnel surgery about a week ago and went through her prescription of percocet and has not been using anything else to control the pain  Patient presents with her surgical incision intact, healing appropriately, with tenderness to touch and limited ROM  Vitals obtained  Call light provided  Dr. Terrence Zamarripa at bedside to evaluate patient     Miranda Fernandez RN  10/28/21 0910

## 2021-10-28 NOTE — ED PROVIDER NOTES
[address] ED  EMERGENCY DEPARTMENT ENCOUNTER      Pt Name: Tommy Borrego  MRN: [id number]  [place] 1970  Date of evaluation: 10/28/2021  Provider: Cori Duenas MD    CHIEF COMPLAINT     Chief Complaint   Patient presents with    Arm Pain         HISTORY OF PRESENT ILLNESS   (Location/Symptom, Timing/Onset, Context/Setting,Quality, Duration, Modifying Factors, Severity)  Note limiting factors. Tommy Borrego is a 49 y.o. female who presents to the emergency department with chief complaint of swelling and pain over the left elbow that started yesterday. Patient underwent surgical ulnar nerve release at the left elbow 9 days ago and had been progressing otherwise uneventfully. She has also undergone carpal tunnel release at the left wrist in August.    The history is provided by the patient. Nursing Notes werereviewed. REVIEW OF SYSTEMS    (2-9 systems for level 4, 10 or more for level 5)     Review of Systems   Constitutional: Negative for chills and fever. Respiratory: Negative for shortness of breath. All other systems reviewed and are negative. Except as noted above the remainder of the review of systems was reviewed and negative. PAST MEDICAL HISTORY     Past Medical History:   Diagnosis Date    Ovarian cyst          SURGICALHISTORY       Past Surgical History:   Procedure Laterality Date    DILATION AND CURETTAGE OF UTERUS      ECTOPIC PREGNANCY SURGERY      KNEE SURGERY      STERNUM FRACTURE SURGERY      TONSILLECTOMY      WRIST SURGERY           CURRENT MEDICATIONS       Previous Medications    ONDANSETRON (ZOFRAN ODT) 4 MG DISINTEGRATING TABLET    Take 1 tablet by mouth every 8 hours as needed for Nausea       ALLERGIES     Patient has no known allergies. FAMILY HISTORY     History reviewed. No pertinent family history.        SOCIAL HISTORY       Social History     Socioeconomic History    Marital status: Single     Spouse name: None    Number of children: None    Years of education: None    Highest education level: None   Occupational History    None   Tobacco Use    Smoking status: Current Some Day Smoker     Packs/day: 1.00     Types: Cigarettes    Smokeless tobacco: Never Used   Substance and Sexual Activity    Alcohol use: Never    Drug use: Never    Sexual activity: Never   Other Topics Concern    None   Social History Narrative    None     Social Determinants of Health     Financial Resource Strain:     Difficulty of Paying Living Expenses:    Food Insecurity:     Worried About Running Out of Food in the Last Year:     Ran Out of Food in the Last Year:    Transportation Needs:     Lack of Transportation (Medical):  Lack of Transportation (Non-Medical):    Physical Activity:     Days of Exercise per Week:     Minutes of Exercise per Session:    Stress:     Feeling of Stress :    Social Connections:     Frequency of Communication with Friends and Family:     Frequency of Social Gatherings with Friends and Family:     Attends [employer] Services:     Active Member of Clubs or Organizations:     Attends Club or Organization Meetings:     Marital Status:    Intimate Partner Violence:     Fear of Current or Ex-Partner:     Emotionally Abused:     Physically Abused:     Sexually Abused:        SCREENINGS             PHYSICAL EXAM    (up to 7 for level 4, 8 or more for level 5)     ED Triage Vitals   BP Temp Temp Source Pulse Resp SpO2 Height Weight   10/28/21 0842 10/28/21 0840 10/28/21 0840 10/28/21 0842 10/28/21 0840 10/28/21 0842 10/28/21 0840 10/28/21 0840   (!) 139/97 98.3 °F (36.8 °C) Oral 87 20 100 % 5' 6\" (1.676 m) 165 lb (74.8 kg)       Physical Exam  Vitals reviewed. Constitutional:       General: She is in acute distress. Musculoskeletal:      Comments: Patient has swelling and redness with underlying induration posteriorly over the left elbow.   Small amount of dried pus is seen over the incision at the left file for this patient. Summation      Patient Course: Discharged. ED Medicationsadministered this visit:    Medications   cephALEXin (KEFLEX) capsule 500 mg (has no administration in time range)   doxycycline hyclate (VIBRA-TABS) tablet 100 mg (has no administration in time range)   HYDROcodone-acetaminophen (NORCO) 5-325 MG per tablet 1 tablet (has no administration in time range)       New Prescriptions from this visit:    New Prescriptions    CEPHALEXIN (KEFLEX) 500 MG CAPSULE    Take 1 capsule by mouth 3 times daily    DOXYCYCLINE MONOHYDRATE (ADOXA) 100 MG TABLET    Take 1 tablet by mouth 2 times daily for 10 days    HYDROCODONE-ACETAMINOPHEN (NORCO) 5-325 MG PER TABLET    Take 1 tablet by mouth 3 times daily as needed for Pain for up to 2 days. Follow-up:  Isak Cook MD  [place] [id number] [phone]      TODAY        Final Impression:   1.  Wound infection after surgery               (Please note that portions of this note were completed with a voice recognitionprogram.  Efforts were made to edit the dictations but occasionally words are mis-transcribed.)    Saima Gimenez MD (electronically signed)  Attending Emergency Physician            Saima Gimenez MD  10/28/21 1758

## 2021-11-14 ENCOUNTER — HOSPITAL ENCOUNTER (EMERGENCY)
Facility: CLINIC | Age: 51
Discharge: HOME | End: 2021-11-14
Attending: EMERGENCY MEDICINE
Payer: COMMERCIAL

## 2021-11-14 VITALS
OXYGEN SATURATION: 97 % | RESPIRATION RATE: 24 BRPM | SYSTOLIC BLOOD PRESSURE: 132 MMHG | HEART RATE: 91 BPM | DIASTOLIC BLOOD PRESSURE: 87 MMHG

## 2021-11-14 DIAGNOSIS — T50.901A ACCIDENTAL DRUG OVERDOSE, INITIAL ENCOUNTER: Primary | ICD-10-CM

## 2021-11-14 PROCEDURE — 99283 EMERGENCY DEPT VISIT LOW MDM: CPT

## 2021-11-14 ASSESSMENT — PAIN SCALES - GENERAL: PAINLEVEL_OUTOF10: 5

## 2021-11-18 ENCOUNTER — HOSPITAL ENCOUNTER (EMERGENCY)
Facility: CLINIC | Age: 51
Discharge: HOME | End: 2021-11-18
Attending: EMERGENCY MEDICINE
Payer: COMMERCIAL

## 2021-11-18 VITALS
HEART RATE: 75 BPM | RESPIRATION RATE: 15 BRPM | BODY MASS INDEX: 21.63 KG/M2 | DIASTOLIC BLOOD PRESSURE: 72 MMHG | WEIGHT: 134 LBS | TEMPERATURE: 98.4 F | OXYGEN SATURATION: 95 % | SYSTOLIC BLOOD PRESSURE: 107 MMHG

## 2021-11-18 DIAGNOSIS — T50.901A ACCIDENTAL DRUG OVERDOSE, INITIAL ENCOUNTER: Primary | ICD-10-CM

## 2021-11-18 LAB
ABSOLUTE EOS #: 0.1 K/UL (ref 0–0.4)
ABSOLUTE IMMATURE GRANULOCYTE: NORMAL K/UL (ref 0–0.3)
ABSOLUTE LYMPH #: 1.3 K/UL (ref 1–4.8)
ABSOLUTE MONO #: 0.4 K/UL (ref 0.1–1.2)
ALBUMIN SERPL-MCNC: 3.7 G/DL (ref 3.5–5.2)
ALBUMIN/GLOBULIN RATIO: 1.1 (ref 1–2.5)
ALP BLD-CCNC: 115 U/L (ref 35–104)
ALT SERPL-CCNC: 31 U/L (ref 5–33)
AMPHETAMINE SCREEN URINE: NEGATIVE
ANION GAP SERPL CALCULATED.3IONS-SCNC: 13 MMOL/L (ref 9–17)
AST SERPL-CCNC: 70 U/L
BARBITURATE SCREEN URINE: NEGATIVE
BASOPHILS # BLD: 1 % (ref 0–2)
BASOPHILS ABSOLUTE: 0 K/UL (ref 0–0.2)
BENZODIAZEPINE SCREEN, URINE: NEGATIVE
BILIRUB SERPL-MCNC: 0.4 MG/DL (ref 0.3–1.2)
BILIRUBIN URINE: NEGATIVE
BUN BLDV-MCNC: 13 MG/DL (ref 6–20)
BUN/CREAT BLD: ABNORMAL (ref 9–20)
BUPRENORPHINE URINE: ABNORMAL
CALCIUM SERPL-MCNC: 8.7 MG/DL (ref 8.6–10.4)
CANNABINOID SCREEN URINE: POSITIVE
CHLORIDE BLD-SCNC: 103 MMOL/L (ref 98–107)
CO2: 22 MMOL/L (ref 20–31)
COCAINE METABOLITE, URINE: POSITIVE
COLOR: YELLOW
COMMENT UA: ABNORMAL
CREAT SERPL-MCNC: 0.7 MG/DL (ref 0.5–0.9)
DIFFERENTIAL TYPE: NORMAL
EOSINOPHILS RELATIVE PERCENT: 2 % (ref 1–4)
ETHANOL PERCENT: <0.01 %
ETHANOL: <10 MG/DL
GFR AFRICAN AMERICAN: >60 ML/MIN
GFR NON-AFRICAN AMERICAN: >60 ML/MIN
GFR SERPL CREATININE-BSD FRML MDRD: ABNORMAL ML/MIN/{1.73_M2}
GFR SERPL CREATININE-BSD FRML MDRD: ABNORMAL ML/MIN/{1.73_M2}
GLUCOSE BLD-MCNC: 249 MG/DL (ref 70–99)
GLUCOSE BLD-MCNC: 284 MG/DL (ref 65–105)
GLUCOSE URINE: NEGATIVE
HCT VFR BLD CALC: 40.7 % (ref 36–46)
HEMOGLOBIN: 13.4 G/DL (ref 12–16)
IMMATURE GRANULOCYTES: NORMAL %
KETONES, URINE: ABNORMAL
LEUKOCYTE ESTERASE, URINE: NEGATIVE
LYMPHOCYTES # BLD: 28 % (ref 24–44)
MCH RBC QN AUTO: 29.4 PG (ref 26–34)
MCHC RBC AUTO-ENTMCNC: 32.8 G/DL (ref 31–37)
MCV RBC AUTO: 89.7 FL (ref 80–100)
MDMA URINE: ABNORMAL
METHADONE SCREEN, URINE: NEGATIVE
METHAMPHETAMINE, URINE: ABNORMAL
MONOCYTES # BLD: 8 % (ref 2–11)
NITRITE, URINE: NEGATIVE
NRBC AUTOMATED: NORMAL PER 100 WBC
OPIATES, URINE: NEGATIVE
OXYCODONE SCREEN URINE: NEGATIVE
PDW BLD-RTO: 13.6 % (ref 12.5–15.4)
PH UA: 6 (ref 5–8)
PHENCYCLIDINE, URINE: NEGATIVE
PLATELET # BLD: 353 K/UL (ref 140–450)
PLATELET ESTIMATE: NORMAL
PMV BLD AUTO: 6.7 FL (ref 6–12)
POTASSIUM SERPL-SCNC: 4.4 MMOL/L (ref 3.7–5.3)
PROPOXYPHENE, URINE: ABNORMAL
PROTEIN UA: NEGATIVE
RBC # BLD: 4.54 M/UL (ref 4–5.2)
RBC # BLD: NORMAL 10*6/UL
SEG NEUTROPHILS: 61 % (ref 36–66)
SEGMENTED NEUTROPHILS ABSOLUTE COUNT: 2.7 K/UL (ref 1.8–7.7)
SODIUM BLD-SCNC: 138 MMOL/L (ref 135–144)
SPECIFIC GRAVITY UA: 1.02 (ref 1–1.03)
TEST INFORMATION: ABNORMAL
TOTAL PROTEIN: 7.1 G/DL (ref 6.4–8.3)
TRICYCLIC ANTIDEPRESSANTS, UR: ABNORMAL
TURBIDITY: CLEAR
URINE HGB: NEGATIVE
UROBILINOGEN, URINE: NORMAL
WBC # BLD: 4.5 K/UL (ref 3.5–11)
WBC # BLD: NORMAL 10*3/UL

## 2021-11-18 PROCEDURE — 36415 COLL VENOUS BLD VENIPUNCTURE: CPT

## 2021-11-18 PROCEDURE — 83036 HEMOGLOBIN GLYCOSYLATED A1C: CPT

## 2021-11-18 PROCEDURE — 99284 EMERGENCY DEPT VISIT MOD MDM: CPT

## 2021-11-18 PROCEDURE — 85025 COMPLETE CBC W/AUTO DIFF WBC: CPT

## 2021-11-18 PROCEDURE — 80307 DRUG TEST PRSMV CHEM ANLYZR: CPT

## 2021-11-18 PROCEDURE — 80053 COMPREHEN METABOLIC PANEL: CPT

## 2021-11-18 PROCEDURE — 2580000003 HC RX 258: Performed by: EMERGENCY MEDICINE

## 2021-11-18 PROCEDURE — 81003 URINALYSIS AUTO W/O SCOPE: CPT

## 2021-11-18 PROCEDURE — G0480 DRUG TEST DEF 1-7 CLASSES: HCPCS

## 2021-11-18 PROCEDURE — 82947 ASSAY GLUCOSE BLOOD QUANT: CPT

## 2021-11-18 RX ORDER — SODIUM CHLORIDE, SODIUM LACTATE, POTASSIUM CHLORIDE, AND CALCIUM CHLORIDE .6; .31; .03; .02 G/100ML; G/100ML; G/100ML; G/100ML
1000 INJECTION, SOLUTION INTRAVENOUS ONCE
Status: COMPLETED | OUTPATIENT
Start: 2021-11-18 | End: 2021-11-18

## 2021-11-18 RX ORDER — 0.9 % SODIUM CHLORIDE 0.9 %
1000 INTRAVENOUS SOLUTION INTRAVENOUS ONCE
Status: COMPLETED | OUTPATIENT
Start: 2021-11-18 | End: 2021-11-18

## 2021-11-18 RX ADMIN — SODIUM CHLORIDE, POTASSIUM CHLORIDE, SODIUM LACTATE AND CALCIUM CHLORIDE 1000 ML: 600; 310; 30; 20 INJECTION, SOLUTION INTRAVENOUS at 16:30

## 2021-11-18 RX ADMIN — SODIUM CHLORIDE 1000 ML: 9 INJECTION, SOLUTION INTRAVENOUS at 18:34

## 2021-11-18 NOTE — ED PROVIDER NOTES
[address] ED  EMERGENCY DEPARTMENT ENCOUNTER      Pt Name: Jia Wagner  MRN: [id number]  [place] 1970  Date of evaluation: 11/18/2021  Provider: Akin Parks MD    CHIEF COMPLAINT   No chief complaint on file. HISTORY OF PRESENT ILLNESS   (Location/Symptom, Timing/Onset, Context/Setting,Quality, Duration, Modifying Factors, Severity)  Note limiting factors. Jia Wagner is a 49 y.o. female who presents to the emergency department by EMS for evaluation of drug overdose. Patient admitted to using cocaine and heroin by inhalation at home today she was discovered unconscious by her daughter who administered a total of 8 mg of intranasal Narcan which revived her. She has been somewhat agitated and is otherwise transferred to the ED uneventfully by EMS. Blood sugar was high in the 300s. She is not a known diabetic. The history is provided by the patient, the EMS personnel and medical records. Nursing Notes werereviewed. REVIEW OF SYSTEMS    (2-9 systems for level 4, 10 or more for level 5)     Review of Systems   Unable to perform ROS: Mental status change       Except as noted above the remainder of the review of systems was reviewed and negative. PAST MEDICAL HISTORY     Past Medical History:   Diagnosis Date    Ovarian cyst          SURGICALHISTORY       Past Surgical History:   Procedure Laterality Date    DILATION AND CURETTAGE OF UTERUS      ECTOPIC PREGNANCY SURGERY      KNEE SURGERY      STERNUM FRACTURE SURGERY      TONSILLECTOMY      WRIST SURGERY           CURRENT MEDICATIONS       Previous Medications    No medications on file       ALLERGIES     Patient has no known allergies. FAMILY HISTORY     No family history on file.        SOCIAL HISTORY       Social History     Socioeconomic History    Marital status: Single     Spouse name: Not on file    Number of children: Not on file    Years of education: Not on file    Highest education level: Not on file   Occupational History    Not on file   Tobacco Use    Smoking status: Current Some Day Smoker     Packs/day: 1.00     Types: Cigarettes    Smokeless tobacco: Never Used   Substance and Sexual Activity    Alcohol use: Never    Drug use: Never    Sexual activity: Never   Other Topics Concern    Not on file   Social History Narrative    Not on file     Social Determinants of Health     Financial Resource Strain:     Difficulty of Paying Living Expenses: Not on file   Food Insecurity:     Worried About Running Out of Food in the Last Year: Not on file    Gloria of Food in the Last Year: Not on file   Transportation Needs:     Lack of Transportation (Medical): Not on file    Lack of Transportation (Non-Medical):  Not on file   Physical Activity:     Days of Exercise per Week: Not on file    Minutes of Exercise per Session: Not on file   Stress:     Feeling of Stress : Not on file   Social Connections:     Frequency of Communication with Friends and Family: Not on file    Frequency of Social Gatherings with Friends and Family: Not on file    Attends Hindu Services: Not on file    Active Member of [address] [place] [address] or Organizations: Not on file    Attends Club or Organization Meetings: Not on file    Marital Status: Not on file   Intimate Partner Violence:     Fear of Current or Ex-Partner: Not on file    Emotionally Abused: Not on file    Physically Abused: Not on file    Sexually Abused: Not on file   Housing Stability:     Unable to Pay for Housing in the Last Year: Not on file    Number of [place] in the Last Year: Not on file    Unstable Housing in the Last Year: Not on file       SCREENINGS             PHYSICAL EXAM    (up to 7 for level 4, 8 or more for level 5)     ED Triage Vitals   BP Temp Temp src Pulse Resp SpO2 Height Weight   11/18/21 1613 -- -- 11/18/21 1606 11/18/21 1606 11/18/21 1606 -- 11/18/21 1606   (!) 106/57   99 21 97 %  134 lb (60.8 kg)       Physical Exam  Vitals reviewed. Constitutional:       Comments: Patient is only marginally cooperative and prefers to lie on her right side covering her face with her blanket. She will respond by shaking or nodding to simple questions only. Oxygen saturation is 97% on room air and she is not tachycardic. Her blood-pressure is borderline at 106/57. HENT:      Head: Normocephalic. Right Ear: External ear normal.      Left Ear: External ear normal.      Nose: Nose normal.   Eyes:      Extraocular Movements: Extraocular movements intact. Cardiovascular:      Rate and Rhythm: Normal rate. Pulmonary:      Effort: Pulmonary effort is normal. No respiratory distress. Musculoskeletal:         General: Normal range of motion. Cervical back: Neck supple. Skin:     General: Skin is warm and dry. Coloration: Skin is not pale. Neurological:      General: No focal deficit present. DIAGNOSTIC RESULTS     EKG: All EKG's are interpreted by the Emergency Department Physician who either signs orCo-signs this chart in the absence of a cardiologist.    RADIOLOGY:     Interpretation per the Radiologist below, ifavailable at the time of this note:    No orders to display         ED BEDSIDE ULTRASOUND:   Performed by ED Physician - none    LABS:  Labs Reviewed   POC GLUCOSE FINGERSTICK - Abnormal; Notable for the following components:       Result Value    POC Glucose 284 (*)     All other components within normal limits   CBC WITH AUTO DIFFERENTIAL   COMPREHENSIVE METABOLIC PANEL W/ REFLEX TO MG FOR LOW K   ETHANOL       All other labs were within normal range ornot returned as of this dictation.     EMERGENCY DEPARTMENT COURSE and DIFFERENTIAL DIAGNOSIS/MDM:   Vitals:    Vitals:    11/18/21 1606 11/18/21 1613   BP:  (!) 106/57   Pulse: 99    Resp: 21    SpO2: 97%    Weight: 60.8 kg (134 lb)        ED Course as of 11/19/21 0820   Thu Nov 18, 2021   1901 Patient's father states this is the second time she has overdosed in a week and he would like for her to go to rehab. Patient is agreeable. Urine toxicology screen is ordered and is pending. [SH]      ED Course User Index  [SH] Indio Mckeon MD       [facility]    CONSULTS:  None    PROCEDURES:  Unlessotherwise noted below, none     Procedures    FINAL IMPRESSION    No diagnosis found. DISPOSITION/PLAN   DISPOSITION        PATIENT REFERRED TO:  No follow-up provider specified. DISCHARGE MEDICATIONS:         Problem List:  There is no problem list on file for this patient. Summation      Patient Course: Transfer of care to oncoming physician at change of shift. ED Medicationsadministered this visit:    Medications   lactated ringers bolus (has no administration in time range)       New Prescriptions from this visit:    New Prescriptions    No medications on file       Follow-up:  No follow-up provider specified. Final Impression: No diagnosis found.            (Please note that portions of this note were completed with a voice recognitionprogram.  Efforts were made to edit the dictations but occasionally words are mis-transcribed.)    Indio Mckeon MD (electronically signed)  Attending Emergency Physician            Indio Mckeon MD  11/19/21 1954

## 2021-11-18 NOTE — ED NOTES
[facility] CALLED FOR RICHARD - KEVIN KIM RN - requesting urine tox screen and to speak to pt for additional assessment     Jose Joyner RN  11/18/21 Alexey Mims, RN  11/18/21 4633

## 2021-11-19 LAB
ESTIMATED AVERAGE GLUCOSE: 123 MG/DL
HBA1C MFR BLD: 5.9 % (ref 4–6)

## 2021-11-19 NOTE — ED PROVIDER NOTES
ADDENDUM:        The patient was seen for Drug Overdose (pt received narcan 8 mg total from family members from home kit. PT transported via [address] [place] [address])  . The patient's initial evaluation and plan have been discussed with the prior provider who initially evaluated the patient. Nursing Notes, Past Medical Hx, Past Surgical Hx, Social Hx, Allergies, and Family Hx were all reviewed. PAST MEDICAL HISTORY    has a past medical history of Ovarian cyst.    SURGICAL HISTORY      has a past surgical history that includes knee surgery; Wrist surgery; Tonsillectomy; Dilation and curettage of uterus; Ectopic pregnancy surgery; and Sternum fracture surgery. CURRENT MEDICATIONS       Previous Medications    No medications on file       ALLERGIES     has No Known Allergies. FAMILY HISTORY     has no family status information on file. family history is not on file. SOCIAL HISTORY      reports that she has been smoking cigarettes. She has been smoking about 1.00 pack per day. She has never used smokeless tobacco. She reports that she does not drink alcohol and does not use drugs.     Diagnostic Results     EKG         LABS:   Results for orders placed or performed during the hospital encounter of 11/18/21   CBC Auto Differential   Result Value Ref Range    WBC 4.5 3.5 - 11.0 k/uL    RBC 4.54 4.0 - 5.2 m/uL    Hemoglobin 13.4 12.0 - 16.0 g/dL    Hematocrit 40.7 36 - 46 %    MCV 89.7 80 - 100 fL    MCH 29.4 26 - 34 pg    MCHC 32.8 31 - 37 g/dL    RDW 13.6 12.5 - 15.4 %    Platelets 998 899 - 712 k/uL    MPV 6.7 6.0 - 12.0 fL    NRBC Automated NOT REPORTED per 100 WBC    Differential Type NOT REPORTED     Seg Neutrophils 61 36 - 66 %    Lymphocytes 28 24 - 44 %    Monocytes 8 2 - 11 %    Eosinophils % 2 1 - 4 %    Basophils 1 0 - 2 %    Immature Granulocytes NOT REPORTED 0 %    Segs Absolute 2.70 1.8 - 7.7 k/uL    Absolute Lymph # 1.30 1.0 - 4.8 k/uL    Absolute Mono # 0.40 0.1 - 1.2 k/uL    Absolute Eos # 0. 10 0.0 - 0.4 k/uL    Basophils Absolute 0.00 0.0 - 0.2 k/uL    Absolute Immature Granulocyte NOT REPORTED 0.00 - 0.30 k/uL    WBC Morphology NOT REPORTED     RBC Morphology NOT REPORTED     Platelet Estimate NOT REPORTED    Comprehensive Metabolic Panel w/ Reflex to MG   Result Value Ref Range    Glucose 249 (H) 70 - 99 mg/dL    BUN 13 6 - 20 mg/dL    CREATININE 0.70 0.50 - 0.90 mg/dL    Bun/Cre Ratio NOT REPORTED 9 - 20    Calcium 8.7 8.6 - 10.4 mg/dL    Sodium 138 135 - 144 mmol/L    Potassium 4.4 3.7 - 5.3 mmol/L    Chloride 103 98 - 107 mmol/L    CO2 22 20 - 31 mmol/L    Anion Gap 13 9 - 17 mmol/L    Alkaline Phosphatase 115 (H) 35 - 104 U/L    ALT 31 5 - 33 U/L    AST 70 (H) <32 U/L    Total Bilirubin 0.40 0.3 - 1.2 mg/dL    Total Protein 7.1 6.4 - 8.3 g/dL    Albumin 3.7 3.5 - 5.2 g/dL    Albumin/Globulin Ratio 1.1 1.0 - 2.5    GFR Non-African American >60 >60 mL/min    GFR African American >60 >60 mL/min    GFR Comment          GFR Staging NOT REPORTED    Ethanol   Result Value Ref Range    Ethanol <10 <10 mg/dL    Ethanol percent <0.010 <0.010 %   Urinalysis Reflex to Culture    Specimen: Urine, clean catch   Result Value Ref Range    Color, UA Yellow Yellow    Turbidity UA Clear Clear    Glucose, Ur NEGATIVE NEGATIVE    Bilirubin Urine NEGATIVE NEGATIVE    Ketones, Urine LARGE (A) NEGATIVE    Specific Gravity, UA 1.025 1.005 - 1.030    Urine Hgb NEGATIVE NEGATIVE    pH, UA 6.0 5.0 - 8.0    Protein, UA NEGATIVE NEGATIVE    Urobilinogen, Urine Normal Normal    Nitrite, Urine NEGATIVE NEGATIVE    Leukocyte Esterase, Urine NEGATIVE NEGATIVE    Urinalysis Comments       Microscopic exam not performed based on chemical results unless requested in original order. Urinalysis Comments          Urinalysis Comments       Utilizing a urinalysis as the only screening method to exclude a potential uropathogen can be unreliable in many patient populations.   Rapid screening tests are less sensitive than culture and if UTI is a clinical possibility, culture should be considered despite a negative urinalysis. Urine Drug Screen   Result Value Ref Range    Amphetamine Screen, Ur NEGATIVE NEGATIVE    Barbiturate Screen, Ur NEGATIVE NEGATIVE    Benzodiazepine Screen, Urine NEGATIVE NEGATIVE    Cocaine Metabolite, Urine POSITIVE (A) NEGATIVE    Methadone Screen, Urine NEGATIVE NEGATIVE    Opiates, Urine NEGATIVE NEGATIVE    Phencyclidine, Urine NEGATIVE NEGATIVE    Propoxyphene, Urine NOT REPORTED NEGATIVE    Cannabinoid Scrn, Ur POSITIVE (A) NEGATIVE    Oxycodone Screen, Ur NEGATIVE NEGATIVE    Methamphetamine, Urine NOT REPORTED NEGATIVE    Tricyclic Antidepressants, Urine NOT REPORTED NEGATIVE    MDMA, Urine NOT REPORTED NEGATIVE    Buprenorphine Urine NOT REPORTED NEGATIVE    Test Information       Assay provides medical screening only. The absence of expected drug(s) and/or metabolite(s) may indicate diluted or adulterated urine, limitations of testing or timing of collection. POC Glucose Fingerstick   Result Value Ref Range    POC Glucose 284 (H) 65 - 105 mg/dL       RADIOLOGY:  No orders to display         ED Course     The patient was given the following medications:  Orders Placed This Encounter   Medications    lactated ringers bolus    0.9 % sodium chloride bolus         RECENT VITALS:  /62   Pulse 79   Temp 98.4 °F (36.9 °C)   Resp 14   Wt 60.8 kg (134 lb)   LMP  (LMP Unknown)   SpO2 95%   BMI 21.63 kg/m²     9:20 PM father bedside. Willing to take the patient to [facility]. She did come back positive for cocaine. On my evaluation she is alert answering questions appropriately no acute distress. Review of her labs show normal white blood cell count and no anemia. Normal electrolytes and kidney function with a slightly elevated blood sugar. Liver enzymes unremarkable. Will discharge to follow-up go directly to [facility] for evaluation.   Advised to go to the nearest hospital if [facility] cannot assist.    OARRS Report if indicated             I have reviewed the disposition diagnosis with the patient and or their family/guardian. I have answered their questions and given discharge instructions. They voiced understanding of these instructions and did not have any further questions or complaints. Disposition     CLINICAL IMPRESSION:  1. Accidental drug overdose, initial encounter        PATIENT REFERRED TO:  [facility]  [address] [place] [id number]  [phone]    Go today        DISCHARGE MEDICATIONS:  New Prescriptions    No medications on file       DISPOSITION:   DISPOSITION Decision To Discharge 11/18/2021 09:14:02 PM      (Please note that portions of this note were completed with a voice recognition program.  Efforts were made to edit the dictations but occasionally words are mis-transcribed.)    Jake Castañeda DO D.O.          Jake Castañeda DO  11/18/21 2121

## 2021-11-19 NOTE — ED NOTES
The following labs have been labeled with pt sticker and walked to lab:    [] Lavender     [] Norlin Current  [] Blue  [] Rollo Preethi                         [] Pink  [] Red  [] Yellow     [] COVID-19 swab    [] Rapid     [x] Urine Sample  [] Pelvic Cultures  [] Blood Cultures      Ajit Thornton RN  11/18/21 2021

## 2021-11-19 NOTE — ED NOTES
Writer called and updated Billy Bailon RN at [facility]. Keli Mayberry reports at this time, pt does not meet the criteria to be directly admitted, but pt can be medically cleared, then discharged, and have a family member drive her to [facility] to be further assessed. Dr. Jana Crisostomo notified.       Ismael Sullivan RN  11/18/21 8015

## 2022-06-19 ENCOUNTER — HOSPITAL ENCOUNTER (EMERGENCY)
Facility: CLINIC | Age: 52
Discharge: HOME | End: 2022-06-19
Attending: EMERGENCY MEDICINE
Payer: COMMERCIAL

## 2022-06-19 ENCOUNTER — APPOINTMENT (OUTPATIENT)
Dept: GENERAL RADIOLOGY | Facility: CLINIC | Age: 52
End: 2022-06-19
Payer: COMMERCIAL

## 2022-06-19 VITALS
RESPIRATION RATE: 16 BRPM | BODY MASS INDEX: 22.5 KG/M2 | TEMPERATURE: 98.4 F | SYSTOLIC BLOOD PRESSURE: 126 MMHG | HEIGHT: 66 IN | HEART RATE: 75 BPM | DIASTOLIC BLOOD PRESSURE: 83 MMHG | WEIGHT: 140 LBS | OXYGEN SATURATION: 99 %

## 2022-06-19 DIAGNOSIS — M25.562 ACUTE PAIN OF LEFT KNEE: ICD-10-CM

## 2022-06-19 DIAGNOSIS — T50.901A ACCIDENTAL DRUG OVERDOSE, INITIAL ENCOUNTER: Primary | ICD-10-CM

## 2022-06-19 PROCEDURE — 6370000000 HC RX 637 (ALT 250 FOR IP)

## 2022-06-19 PROCEDURE — 73562 X-RAY EXAM OF KNEE 3: CPT

## 2022-06-19 PROCEDURE — 99283 EMERGENCY DEPT VISIT LOW MDM: CPT

## 2022-06-19 RX ORDER — ONDANSETRON 4 MG/1
4 TABLET, ORALLY DISINTEGRATING ORAL ONCE
Status: COMPLETED | OUTPATIENT
Start: 2022-06-19 | End: 2022-06-19

## 2022-06-19 RX ORDER — IBUPROFEN 800 MG/1
800 TABLET ORAL ONCE
Status: COMPLETED | OUTPATIENT
Start: 2022-06-19 | End: 2022-06-19

## 2022-06-19 RX ADMIN — ONDANSETRON 4 MG: 4 TABLET, ORALLY DISINTEGRATING ORAL at 15:31

## 2022-06-19 RX ADMIN — IBUPROFEN 800 MG: 800 TABLET, FILM COATED ORAL at 15:31

## 2022-06-19 ASSESSMENT — PAIN SCALES - GENERAL: PAINLEVEL_OUTOF10: 2

## 2022-06-19 ASSESSMENT — PAIN - FUNCTIONAL ASSESSMENT: PAIN_FUNCTIONAL_ASSESSMENT: 0-10

## 2022-06-19 NOTE — ED PROVIDER NOTES
[facility] ED  [address] [facility]  Phone: [phone]      Attending Physician [address]       Chief Complaint   Patient presents with    Drug Overdose     Snorted Heroin    Emesis       DIAGNOSTIC RESULTS     LABS:  Labs Reviewed - No data to display    All other labs were within normal range or not returned as of this dictation. RADIOLOGY:  XR KNEE LEFT (3 VIEWS)   Final Result   No acute osseous abnormality. EMERGENCY DEPARTMENT COURSE:   Vitals:    Vitals:    06/19/22 1441 06/19/22 1530   BP: 126/83    Pulse: 75    Resp: 16    Temp:  98.4 °F (36.9 °C)   TempSrc: Oral    SpO2: 99%    Weight: 63.5 kg (140 lb)    Height: 5' 6\" (1.676 m)      -------------------------  BP: 126/83, Temp: 98.4 °F (36.9 °C), Heart Rate: 75, Resp: 16             PERTINENT ATTENDING PHYSICIAN COMMENTS:    I performed a history and physical examination of the patient and discussed management with the mid level provider. I reviewed the mid level provider's note and agree with the documented findings and plan of care. Any areas of disagreement are noted on the chart. I was personally present for the key portions of any procedures. I have documented in the chart those procedures where I was not present during the key portions. I have reviewed the emergency nurses triage note. I agree with the chief complaint, past medical history, past surgical history, allergies, medications, social and family history as documented unless otherwise noted below. Documentation of the HPI, Physical Exam and Medical Decision Making performed by mid level providers is based on my personal performance of the HPI, PE and MDM. For Physician Assistant/ Nurse Practitioner cases/documentation I have personally evaluated this patient and have completed at least one if not all key elements of the E/M (history, physical exam, and MDM). Additional findings are as noted.     Patient had presented to the emergency department after an accidental drug overdose. She admits to \"snorting a line of something she should not have\". She was observed for about 2 hours in the emergency department. She had no complaints. No chest pain or shortness of breath. No cough or wheeze. She feels fine. She is not sleepy, she is alert, oriented and capacitated.   She is advised to stop using drugs and follow-up with PCP      (Please note that portions of this note were completed with a voice recognition program.  Efforts were made to edit the dictations but occasionally words are mis-transcribed.)    Sergio Wray DO  Attending Emergency Medicine Physician       Sergio Wray DO  06/19/22 0755

## 2022-06-19 NOTE — ED NOTES
Pt standing at nurse's station, attempting to call family and friends for ride home      Riri Olsen, [address] [facility]  06/19/22 5

## 2022-06-19 NOTE — ED PROVIDER NOTES
[facility] ED  [address] [facility]  Phone: [phone]        Pt Name: Ernestine Nathan  MRN: [id number]  [place] 1970  Date of evaluation: 6/19/22    CHIEFCOMPLAINT       Chief Complaint   Patient presents with    Drug Overdose     Snorted Heroin    Emesis     HISTORY OF PRESENT ILLNESS (Location/Symptom, Timing/Onset, Context/Setting, Quality, Duration, Modifying Factors, Severity)      Ernestine Nathan is a 49 y.o. female presents emergency room today by EMS for evaluation after drug overdose. Patient admits to snorting heroin and was found unconscious by family who called EMS. Patient was given Narcan 2 mg IN and Narcan 1 mg IV by EMS. Patient does appear somewhat agitated upon arrival and complaining of nausea. IV that was placed by EMS was accidentally discontinued by patient. Patient does report a history of substance abuse with previous drug overdoses in the past and states that this was an unintentional overdose. He states that she is supposed to leave this week to go to [place] to stay with family for treatment. She is complaining of left knee pain that has been ongoing for approximately 1 month without known injury. She states that she has not had it evaluated. Patient does deny any complaints of headache, chest pain, shortness of breath, abdominal pain, vomiting, diarrhea. PAST MEDICAL / SURGICAL / SOCIAL / FAMILY HISTORY     PMH:  has a past medical history of Heroin abuse ([address] and Ovarian cyst.  Surgical History:  has a past surgical history that includes knee surgery; Wrist surgery; Tonsillectomy; Dilation and curettage of uterus; Ectopic pregnancy surgery; and Sternum fracture surgery. Social History:  reports that she has been smoking cigarettes. She has been smoking about 1.00 pack per day. She has never used smokeless tobacco. She reports that she does not drink alcohol and does not use drugs. Family History: has no family status information on file.     family history is not on file. Psychiatric History: None    Allergies: Patient has no known allergies. Home Medications:   Prior to Admission medications    Not on File       REVIEW OF SYSTEMS  (2-9 systems for level 4, 10 ormore for level 5)      Review of Systems    Constitutional: See HPI. Denies fever or chills. Eyes: Denies vision changes. HENT: Denies sore throat or neck pain. Respiratory: Denies cough or shortness of breath. Cardiovascular: Denies chest pain. GI: Complaining of nausea. Denies vomiting or diarrhea. : Denies painful urination. Musculoskeletal: Denies recent trauma. Complaining of left knee pain  Skin: Denies new rashes or wounds. Neurologic:  Denies new numbness or weakness. All other systems negative except as marked. PHYSICAL EXAM  (up to 7 for level 4, 8 or more for level 5)      INITIAL VITALS:  height is 5' 6\" (1.676 m) and weight is 63.5 kg (140 lb). Her temperature is 98.4 °F (36.9 °C). Her blood pressure is 126/83 and her pulse is 75. Her respiration is 16 and oxygen saturation is 99%. Vital signs reviewed. Physical Exam    General:  Alert, cooperative, appears stated age, and is in no acute distress. Head:  Normocephalic, atraumatic, and without obvious abnormality. Eyes:  Sclerae/conjunctivae clear without injection, pallor, or icterus. Corneas clear without opacities. EOM's intact. ENT: Ears and nose are all without obvious masses lesion or deformity. Neck: Supple and symmetrical. Trachea midline. No adenopathy. No jugular venous distention. Lungs:   No respiratory distress. Clear to auscultation bilaterally. No wheezes, rhonchi, or rales. Heart:  Regular rate. Regular rhythm. No murmurs, rubs, or gallops. Abdomen:   Normoactive bowel sounds. Soft, nontender, nondistended without guarding or rebound. No palpable masses. No CVA tenderness. Extremities: Warm and dry without erythema or edema. Tenderness over anterior knee medial to patella. Range of motion intact. No gross deformity. Distal neurovascular exam intact. Skin: Soft, good turgor, and well-hydrated. No obvious rashes or lesions. Neurologic: GCS is 15 and no focal deficits are appreciated. Normal gait. Grossly normal motor and sensation. Speech clear. DIFFERENTIAL DIAGNOSIS / MDM     PLAN (LABS / IMAGING / EKG):  Orders Placed This Encounter   Procedures    XR KNEE LEFT (3 VIEWS)       MEDICATIONS ORDERED:  Orders Placed This Encounter   Medications    ondansetron (ZOFRAN-ODT) disintegrating tablet 4 mg    ibuprofen (ADVIL;MOTRIN) tablet 800 mg       DIAGNOSTIC RESULTS     EKG: All EKG's are interpreted by the Emergency Department Physician who either signs or Co-signs this chart in the [address] a cardiologist.    Not indicated    RADIOLOGY: All images are read by the radiologist and their interpretations are reviewed. XR KNEE LEFT (3 VIEWS)    Result Date: 6/19/2022  EXAMINATION: THREE XRAY VIEWS OF THE LEFT KNEE 6/19/2022 12:12 pm COMPARISON: None. HISTORY: ORDERING SYSTEM PROVIDED HISTORY: knee pain TECHNOLOGIST PROVIDED HISTORY: knee pain Reason for Exam: Pt states knee pain, fall, drug overdose, pt is uncooperative, best images possible FINDINGS: There is no evidence of acute fracture. There is normal alignment. No acute joint abnormality. No focal osseous lesion. No focal soft tissue abnormality. No acute osseous abnormality. LABS:  No results found for this visit on 06/19/22. EMERGENCY DEPARTMENT COURSE     Arrival: Patient presents emergency room today with complaints as described above. Vital signs are within normal limits. Patient is alert and oriented to person place time and circumstance. Respirations are even and nonlabored at 16 breaths/min. Room air saturation is 99%. Will observe patient and I have placed an order for a x-ray of her left knee. Zofran ODT for nausea, ibuprofen for left knee pain.     3:44pm: Patient remains alert, oriented and hemodynamically stable. Respirations 16. Room air saturation 98%. X-ray of left knee reveals no acute osseous abnormality. I have discussed results with the patient and recommended follow-up with PCP for continued complaints. I feel at this time patient is ready for discharge and I have instructed her to call for a ride. The patient presented with knee pain. A fracture was not detected on X-ray. The hip and ankle joints were not affected and the foot is stable on exam. No skin lesions were seen. There are no signs of compartment syndrome. The pulses are 2/4. The motor is 5/5. The sensation is intact. The pt was advised to return to the Emergency Department for increasing pain, numbness, weakness, or coldness to the extremity. The pt was further instructed to follow up in 2-3 days with their family doctor or orthopedics. Pt voiced understanding of instructions. The patient understands that at this time there is no evidence for a more malignant underlying process, but the patient also understands that early in the process of an illness or injury, an emergency department workup can be falsely reassuring. Routine discharge counseling was given, and the patient understands that worsening, changing or persistent symptoms should prompt an immediate call or follow up with their primary physician or return to the emergency department. The importance of appropriate follow up was also discussed. I have reviewed the disposition diagnosis with the patient and or their family/guardian. I have answered their questions and given discharge instructions. They voiced understanding of these instructions and did not have any further questions or complaints.     Vitals:    Vitals:    06/19/22 1441 06/19/22 1530   BP: 126/83    Pulse: 75    Resp: 16    Temp:  98.4 °F (36.9 °C)   TempSrc: Oral    SpO2: 99%    Weight: 63.5 kg (140 lb)    Height: 5' 6\" (1.676 m)      -------------------------  BP: 126/83, Temp: 98.4 °F (36.9 °C), Heart Rate: 75, Resp: 16      RE-EVALUATION:  See ED Course notes above. The patient and/or family and I have discussed the diagnosis and risks, and we agree with discharging home to follow-up with their pertinent providers. The patient appears stable for discharge and has been instructed to return immediately for new concerning symptoms or if the symptoms worsen in any way. The patient understands that at this time there is no evidence for a more malignant underlying process, but the patient also understands that early in the process of an illness or injury, an emergency department workup can be falsely reassuring. Routine discharge counseling was given, and the patient understands that worsening, changing or persistent symptoms should prompt an immediate call or follow up with their primary physician or return to the emergency department. I have reviewed the disposition diagnosis with the patient and or their family/guardian. I have answered their questions and given discharge instructions. They voiced understanding of these instructions and did not have any further questions or complaints. This patient was seen by the attending physician and they agreed with the assessment and plan. CONSULTS:  None    PROCEDURES:  None    FINAL IMPRESSION      1. Accidental drug overdose, initial encounter    2. Acute pain of left knee          DISPOSITION / PLAN     CONDITION ON DISPOSITION:   Good / Stable for discharge. PATIENT REFERRED TO:    Follow-up with provider of choice or call [place]  Call in 2 days      [facility]  [address] [phone]    As needed      DISCHARGE MEDICATIONS:  There are no discharge medications for this patient.       GORDON Rodriguez - CNP   Emergency Medicine Physician Assistant    (Please note that portions of this note were completed with a voice recognition program.  Efforts were made to edit the dictations but occasionally words aremis-transcribed.)     Yevgeniy [place], APRN - CNP  06/19/22 4341

## 2022-12-19 ENCOUNTER — APPOINTMENT (OUTPATIENT)
Dept: MRI IMAGING | Age: 52
End: 2022-12-19
Payer: COMMERCIAL

## 2022-12-19 ENCOUNTER — APPOINTMENT (OUTPATIENT)
Dept: CT IMAGING | Age: 52
End: 2022-12-19
Payer: COMMERCIAL

## 2022-12-19 ENCOUNTER — HOSPITAL ENCOUNTER (OUTPATIENT)
Age: 52
Setting detail: OBSERVATION
Discharge: HOME | End: 2022-12-20
Attending: EMERGENCY MEDICINE | Admitting: EMERGENCY MEDICINE
Payer: COMMERCIAL

## 2022-12-19 DIAGNOSIS — M79.601 RIGHT ARM PAIN: Primary | ICD-10-CM

## 2022-12-19 LAB
SARS-COV-2, RAPID: NOT DETECTED
SPECIMEN DESCRIPTION: NORMAL

## 2022-12-19 PROCEDURE — 99244 OFF/OP CNSLTJ NEW/EST MOD 40: CPT | Performed by: PSYCHIATRY & NEUROLOGY

## 2022-12-19 PROCEDURE — 6360000002 HC RX W HCPCS: Performed by: STUDENT IN AN ORGANIZED HEALTH CARE EDUCATION/TRAINING PROGRAM

## 2022-12-19 PROCEDURE — 72125 CT NECK SPINE W/O DYE: CPT

## 2022-12-19 PROCEDURE — 2580000003 HC RX 258: Performed by: STUDENT IN AN ORGANIZED HEALTH CARE EDUCATION/TRAINING PROGRAM

## 2022-12-19 PROCEDURE — 99285 EMERGENCY DEPT VISIT HI MDM: CPT

## 2022-12-19 PROCEDURE — G0378 HOSPITAL OBSERVATION PER HR: HCPCS

## 2022-12-19 PROCEDURE — 87635 SARS-COV-2 COVID-19 AMP PRB: CPT

## 2022-12-19 PROCEDURE — 96374 THER/PROPH/DIAG INJ IV PUSH: CPT

## 2022-12-19 PROCEDURE — 72141 MRI NECK SPINE W/O DYE: CPT

## 2022-12-19 RX ORDER — ONDANSETRON 2 MG/ML
4 INJECTION INTRAMUSCULAR; INTRAVENOUS EVERY 6 HOURS PRN
Status: DISCONTINUED | OUTPATIENT
Start: 2022-12-19 | End: 2022-12-20 | Stop reason: HOSPADM

## 2022-12-19 RX ORDER — IBUPROFEN 800 MG/1
800 TABLET ORAL ONCE
Status: DISCONTINUED | OUTPATIENT
Start: 2022-12-19 | End: 2022-12-20 | Stop reason: HOSPADM

## 2022-12-19 RX ORDER — ONDANSETRON 4 MG/1
4 TABLET, ORALLY DISINTEGRATING ORAL EVERY 8 HOURS PRN
Status: DISCONTINUED | OUTPATIENT
Start: 2022-12-19 | End: 2022-12-20 | Stop reason: HOSPADM

## 2022-12-19 RX ORDER — SODIUM CHLORIDE 9 MG/ML
INJECTION, SOLUTION INTRAVENOUS PRN
Status: DISCONTINUED | OUTPATIENT
Start: 2022-12-19 | End: 2022-12-20 | Stop reason: HOSPADM

## 2022-12-19 RX ORDER — 0.9 % SODIUM CHLORIDE 0.9 %
1000 INTRAVENOUS SOLUTION INTRAVENOUS ONCE
Status: COMPLETED | OUTPATIENT
Start: 2022-12-19 | End: 2022-12-19

## 2022-12-19 RX ORDER — ENOXAPARIN SODIUM 100 MG/ML
40 INJECTION SUBCUTANEOUS DAILY
Status: DISCONTINUED | OUTPATIENT
Start: 2022-12-19 | End: 2022-12-20 | Stop reason: HOSPADM

## 2022-12-19 RX ORDER — LORAZEPAM 2 MG/ML
1 INJECTION INTRAMUSCULAR ONCE
Status: COMPLETED | OUTPATIENT
Start: 2022-12-19 | End: 2022-12-19

## 2022-12-19 RX ORDER — SODIUM CHLORIDE 0.9 % (FLUSH) 0.9 %
5-40 SYRINGE (ML) INJECTION EVERY 12 HOURS SCHEDULED
Status: DISCONTINUED | OUTPATIENT
Start: 2022-12-19 | End: 2022-12-20 | Stop reason: HOSPADM

## 2022-12-19 RX ORDER — ACETAMINOPHEN 325 MG/1
650 TABLET ORAL EVERY 4 HOURS PRN
Status: DISCONTINUED | OUTPATIENT
Start: 2022-12-19 | End: 2022-12-20 | Stop reason: HOSPADM

## 2022-12-19 RX ORDER — SODIUM CHLORIDE 0.9 % (FLUSH) 0.9 %
5-40 SYRINGE (ML) INJECTION PRN
Status: DISCONTINUED | OUTPATIENT
Start: 2022-12-19 | End: 2022-12-20 | Stop reason: HOSPADM

## 2022-12-19 RX ADMIN — SODIUM CHLORIDE 1000 ML: 9 INJECTION, SOLUTION INTRAVENOUS at 21:18

## 2022-12-19 RX ADMIN — LORAZEPAM 1 MG: 2 INJECTION INTRAMUSCULAR; INTRAVENOUS at 18:33

## 2022-12-19 ASSESSMENT — ENCOUNTER SYMPTOMS
NAUSEA: 0
SHORTNESS OF BREATH: 0
COUGH: 0
WHEEZING: 0
PHOTOPHOBIA: 0
SORE THROAT: 0
ABDOMINAL PAIN: 0
VOMITING: 0
BACK PAIN: 0

## 2022-12-19 NOTE — ED PROVIDER NOTES
[facility]  Emergency Department  Faculty Attestation     I performed a history and physical examination of the patient and discussed management with the resident. I reviewed the residents note and agree with the documented findings and plan of care. Any areas of disagreement are noted on the chart. I was personally present for the key portions of any procedures. I have documented in the chart those procedures where I was not present during the key portions. I have reviewed the emergency nurses triage note. I agree with the chief complaint, past medical history, past surgical history, allergies, medications, social and family history as documented unless otherwise noted below. For Physician Assistant/ Nurse Practitioner cases/documentation I have personally evaluated this patient and have completed at least one if not all key elements of the E/M (history, physical exam, and MDM). Additional findings are as noted. Primary Care Physician:  No primary care provider on file. Screenings:  [unfilled]    CHIEF COMPLAINT       Chief Complaint   Patient presents with    Arm Pain     Right, just started today       RECENT VITALS:   Temp: 98.1 °F (36.7 °C),  Heart Rate: 91, Resp: 16, BP: 126/76    LABS:  Labs Reviewed - No data to display    Radiology  CT CERVICAL SPINE [address]    (Results Pending)         Attending Physician Additional  Notes    Patient has right upper extremity discomfort pins-and-needles ever since being handcuffed. She denies any injuries to her arm or neck. No injuries to her head. No recent fevers chills or sweats. She states she is never had this previously. No right lower extremity symptoms. She states that the pain radiates from her hand through the arm up into the neck. It is relieved by neck extension. No bowel or bladder incontinence or retention. No saddle anesthesia. No lower extremity symptoms. No strokelike symptoms.   No headache or vision or strength changes. She is declining any pain medications for it but wants to assess diagnosis. On exam she has good motor strength and sensation. Strong pulses and normal capillary refill. Full range of movement of the wrist elbow and shoulder. No pain with axial loading of the cervical spine. No midline cervical spine tenderness. Impression is cervical radiculopathy. Without fever or lower extremity symptoms or urine symptoms or numbness my suspicion for epidural abscess is low. Plan is CT imaging of the cervical spine, analgesics, reassess. [place] Marcial.  Maria C Mayfield MD, [facility]  Attending Emergency  Physician                Raul Baker MD  12/19/22 3690

## 2022-12-19 NOTE — DISCHARGE INSTRUCTIONS
For pain use acetaminophen (Tylenol) or ibuprofen (Motrin / Advil), unless prescribed medications that have acetaminophen or ibuprofen (or similar medications) in it. You can take over the counter acetaminophen tablets (1 - 2 tablets of the 500-mg strength every 6 hours) or ibuprofen tablets (2 tablets every 4 hours). PLEASE RETURN TO THE EMERGENCY DEPARTMENT IMMEDIATELY for worsening of pain, decrease sensation to arms or legs, inability to move arms or legs, shortness of breath, severe chest pain, excessive nausea or vomiting, notice any bruising to your abdomen or have increase in abdominal pain, or if you develop any concerning symptoms such as: high fever not relieved by acetaminophen (Tylenol) and/or ibuprofen (Motrin / Advil), chills, feeling of your heart fluttering or racing, persistent nausea and/or vomiting, vomiting up blood, blood in your stool, loss of consciousness, numbness, weakness or tingling in the arms or legs or change in color of the extremities, changes in mental status, persistent headache, blurry vision, loss of bladder / bowel control, unable to follow up with your physician, or other any other care or concern.

## 2022-12-19 NOTE — CONSULTS
[facility]   [address] [facility]    Neurology Consult Note            Date:   12/19/2022  Patient name: Karma Schmidt  Date of admission:  12/19/2022 12:50 PM  MRN:   [id number]  Account:  [de-identified]  YOB: 1970  PCP:    No primary care provider on file. Room:   [facility]  Code Status:    No Order    Chief Complaint:     Chief Complaint   Patient presents with    Arm Pain     Right, just started today       History Obtained From:     patient    History of Present Illness:     47year old female presented from snf after being arrested this morning and while being handcuff she had complains of right arm pain, tingling burning sensation with neck pain and having weakness. She was brought to the ED for eval where ED was concerned of unexplained right arm numbness without any clear dermatomal distribution complaining of weakness but able to antigravity. Patient had previous hx of cervical radiculopathy. Ct cervical spine wo contrast showed No acute osseous abnormality of the cervical spine. Moderate spondylosis most significant at C4-C6. On my eval, patient was asleep and on awakening she was drowsy. AAO x 3 without any cranial nerve deficit. No motor deficit noticed. Patient was able to antigravity and hold against resistance. Reflexes +3 in the triceps. Negative cochran sign. Patient complains of burning sensation in the whole hand unclear if it goes from neck to hand or otherway around. In the fingers, patient had distirbution in the first thumb and first 2 finger on the palmar surface. Off-note, She had MRI C spine at [facility] in 2017 that showed Degenerative changes of the cervical spine between the C4-C5 and   C6-C7 levels with varying degrees of neural foraminal and central canal stenosis. Neural foraminal stenosis is most significant on the right at C5-C6.      Unclear what manipulation of hand and arm took place in the am and given history of cervical radiculopathy and MRI C spine in 2017 concerning for stenosis in the C5-C6, we would like to get MRI cervical spine wo contrast. Patient has history of drug use but denies having any drugs given patient mild confusion recommend UDS. Patient can be admitted under ED observation neurology will follow-up. Past Medical History:     Past Medical History:   Diagnosis Date    Heroin abuse ([address]     Ovarian cyst         Past Surgical History:     Past Surgical History:   Procedure Laterality Date    DILATION AND CURETTAGE OF UTERUS      ECTOPIC PREGNANCY SURGERY      KNEE SURGERY      STERNUM FRACTURE SURGERY      TONSILLECTOMY      WRIST SURGERY          Medications Prior to Admission:     Prior to Admission medications    Not on File        Allergies:     Patient has no known allergies. Social History:     Tobacco:    reports that she has been smoking cigarettes. She has been smoking an average of 1 pack per day. She has never used smokeless tobacco.  Alcohol:      reports no history of alcohol use. Drug Use:  reports no history of drug use. Family History:     No family history on file. Review of Systems:     Review of Systems   Constitutional:  Negative for fatigue. Eyes:  Negative for photophobia and visual disturbance. Respiratory:  Negative for shortness of breath and wheezing. Cardiovascular:  Negative for palpitations. Gastrointestinal:  Negative for nausea and vomiting. Endocrine: Negative for polydipsia and polyuria. Genitourinary:  Negative for dysuria and urgency. Neurological:  Positive for weakness and numbness. Negative for seizures and facial asymmetry. Psychiatric/Behavioral:  Positive for decreased concentration.         Physical Exam:   /76   Pulse 91   Temp 98.1 °F (36.7 °C) (Oral)   Resp 16   LMP  (LMP Unknown)   SpO2 92%   Temp (24hrs), Av.1 °F (36.7 °C), Min:98.1 °F (36.7 °C), Max:98.1 °F (36.7 °C)    No results for input(s): POCGLU in the last 72 hours. No intake or output data in the 24 hours ending 12/19/22 1611      Neurologic Exam    GENERAL  Appears comfortable and in no distress   HEENT  NC/ AT   HEART  S1 and S2 heard; palpation of pulses: radial pulse    NECK  Supple and no bruits heard   MENTAL STATUS:  Lethargic decrease concentration, oriented, intact memory, no confusion, normal speech, normal language, no hallucination or delusion   CRANIAL NERVES: II     -      Visual fields intact to confrontation  III,IV,VI -  PERR, EOMs full, no ptosis  V     -     Normal facial sensation   VII    -     Normal facial symmetry  VIII   -     Intact hearing   IX,X -     Symmetrical palate  XI    -     Symmetrical shoulder shrug  XII   -     Midline tongue, no atrophy    MOTOR FUNCTION: RUE: Significant for good strength of grade 5/5 in proximal and distal muscle groups   LUE: Significant for good strength of grade 5/5 in proximal and distal muscle groups   RLE: Significant for good strength of grade 5/5 in proximal and distal muscle groups   LLE: Significant for good strength of grade 5/5 in proximal and distal muscle groups      Normal bulk, normal tone and no involuntary movements, no tremor   SENSORY FUNCTION:  Normal touch, normal pinprick, normal vibration, normal proprioception    Patient complains of burning sensation in the whole hand unclear if it goes from neck to hand or otherway around. In the fingers, patient had distirbution in the first thumb and first 2 finger on the palmar surface. CEREBELLAR FUNCTION:  Intact fine motor control over upper limbs and lower limbs   REFLEX FUNCTION:  Symmetric in upper and lower extremities, no Babinski sign  Except    STATION and GAIT  deffered       Investigations:      Laboratory Testing:  No results found for this or any previous visit (from the past 24 hour(s)).       Assessment :      Primary Problem  <principal problem not specified>    There are no active hospital problems to display for this patient. 47year old [facility] being [facility] this morning and while being handcuff she had complains of right arm pain, tingling burning sensation with neck pain and having weakness. Right hand numbness/tingling with subjective weakness   Hx of cervical radiculopathy  Unclear manipulation of RUE  Previous Mri C-spine concerning for mild to moderate stenosis. Plan:     Plan to get MRI cervical spine wo contrast  Recommend repeat UDS given confusion. Outpatient EMG. Neurology will follow along. Consultations:   IP CONSULT TO NEUROLOGY      Follow-up further recommendations after discussing the case with attending  The plan was discussed with the patient, patient's family and the medical staff. Patient is admitted as inpatient status because of co-morbidities listed above, severity of signs and symptoms as outlined, requirement for current medical therapies and most importantly because of direct risk to patient if care not provided in a hospital setting. Raciel Alexander MD  Neurology Resident PGY-3   12/19/2022  4:11 PM    Copy sent to Dr. Campa primary care provider on file.

## 2022-12-19 NOTE — ED NOTES
Patient is 46 yo female who presents to ED in custody from [facility] for new onset R arm pain. Denies any known injury. Patient is a/o x 4 with even non labored respirations. Patient resting on cot with  in room. Dr Lainey Coburn at bedside to assess patient.      Maya Ritter, SHARRONN  20/94/28 6001

## 2022-12-19 NOTE — ED PROVIDER NOTES
[facility] ED  Emergency Department Encounter  Emergency Medicine Resident     Pt Name:Shawna Rivera  MRN: [id number]  [place] 1970  Date of evaluation: 12/19/22  PCP:  No primary care provider on file. CHIEF COMPLAINT       Chief Complaint   Patient presents with    Arm Pain     Right, just started today       HISTORY OF PRESENT ILLNESS  (Location/Symptom, Timing/Onset, Context/Setting, Quality, Duration, Modifying Factors, Severity.)      Angela Patel is a 47 y.o. female who presents with right upper extremity numbness, tingling and weakness starting at 8 or 9 AM today. Patient states that the pain is tingly and goes up to her neck. Patient is able to move and antigravity with her right arm, however has subjective sensory deficits and pain. Patient states that the pain started when they put handcuffs on her this morning. Denies injury to the neck, fall. Patient does have a history of cervical radiculopathy, however was seen at [facility] at the time. Patient comes from senior care. No other complaints at this time. No fevers, chills. PAST MEDICAL / SURGICAL / SOCIAL / FAMILY HISTORY      has a past medical history of Heroin abuse ([facility] [address] and Ovarian cyst.     has a past surgical history that includes knee surgery; Wrist surgery; Tonsillectomy; Dilation and curettage of uterus; Ectopic pregnancy surgery; and Sternum fracture surgery.     Social History     Socioeconomic History    Marital status: Single     Spouse name: Not on file    Number of children: Not on file    Years of education: Not on file    Highest education level: Not on file   Occupational History    Not on file   Tobacco Use    Smoking status: Some Days     Packs/day: 1.00     Types: Cigarettes    Smokeless tobacco: Never   Substance and Sexual Activity    Alcohol use: Never    Drug use: Never    Sexual activity: Never   Other Topics Concern    Not on file   Social History Narrative    Not on file     Social Determinants of Health     Financial Resource Strain: Not on file   Food Insecurity: Not on file   Transportation Needs: Not on file   Physical Activity: Not on file   Stress: Not on file   Social Connections: Not on file   Intimate Partner Violence: Not on file   Housing Stability: Not on file       No family history on file. Allergies:  Patient has no known allergies. Home Medications:  Prior to Admission medications    Not on File       REVIEW OF SYSTEMS    (2-9 systems for level 4, 10 or more for level 5)      Review of Systems   Constitutional:  Negative for chills and fever. HENT:  Negative for sore throat. Eyes:  Negative for visual disturbance. Respiratory:  Negative for cough and shortness of breath. Cardiovascular:  Negative for chest pain. Gastrointestinal:  Negative for abdominal pain and vomiting. Endocrine: Negative for polyuria. Genitourinary:  Negative for dysuria and hematuria. Musculoskeletal:  Negative for back pain. Neurological:  Positive for numbness. Negative for light-headedness and headaches. Psychiatric/Behavioral:  Negative for confusion. PHYSICAL EXAM   (up to 7 for level 4, 8 or more for level 5)      INITIAL VITALS:   /75   Pulse 85   Temp 98.1 °F (36.7 °C) (Oral)   Resp 14   LMP  (LMP Unknown)   SpO2 96%     Physical Exam  Constitutional:       Appearance: Normal appearance. HENT:      Head: Normocephalic. Nose: Nose normal.      Mouth/Throat:      Mouth: Mucous membranes are moist.      Pharynx: Oropharynx is clear. Eyes:      Extraocular Movements: Extraocular movements intact. Pupils: Pupils are equal, round, and reactive to light. Cardiovascular:      Rate and Rhythm: Normal rate and regular rhythm. Pulses: Normal pulses. Heart sounds: Normal heart sounds. Pulmonary:      Effort: Pulmonary effort is normal.      Breath sounds: Normal breath sounds. Abdominal:      Palpations: Abdomen is soft. Tenderness:  There is no abdominal tenderness. There is no right CVA tenderness or left CVA tenderness. Musculoskeletal:      Cervical back: Neck supple. No tenderness. Comments: Patient does have some weakness to the right upper extremity, able to antigravity, however weakness with  strength  Subjective numbness and tingling with pain to the right upper extremity  No C-spine midline tenderness   Skin:     General: Skin is warm. Capillary Refill: Capillary refill takes less than 2 seconds. Neurological:      General: No focal deficit present. Mental Status: She is alert and oriented to person, place, and time. Mental status is at baseline. DIFFERENTIAL  DIAGNOSIS     PLAN (LABS / IMAGING / EKG):  Orders Placed This Encounter   Procedures    CT CERVICAL SPINE WO CONTRAST    DRUG SCREEN MULTI URINE    ADULT DIET; Regular    Vital signs per unit routine    Notify physician    Notify physician    Up with assistance    Full Code    Inpatient consult to Neurology    Place in Observation Service       MEDICATIONS ORDERED:  Orders Placed This Encounter   Medications    ibuprofen (ADVIL;MOTRIN) tablet 800 mg    sodium chloride flush 0.9 % injection 5-40 mL    sodium chloride flush 0.9 % injection 5-40 mL    0.9 % sodium chloride infusion    enoxaparin (LOVENOX) injection 40 mg     Order Specific Question:   Indication of Use     Answer:   Prophylaxis-DVT/PE    acetaminophen (TYLENOL) tablet 650 mg    OR Linked Order Group     ondansetron (ZOFRAN-ODT) disintegrating tablet 4 mg     ondansetron (ZOFRAN) injection 4 mg     DDX: Cervical radiculopathy, peripheral neuropathy, central neuropathy, stroke, psychiatric condition    DIAGNOSTIC RESULTS / EMERGENCY DEPARTMENT COURSE / MDM   LAB RESULTS:  No results found for this visit on 12/19/22. IMPRESSION: 59-year-old lady presents to the emergency department with multiple hour history of right upper extremity pain, tingling and weakness.   Coming from [facility], history of cervical radiculopathy. Vital signs stable, afebrile nontachycardic. Physical exam does show some weakness with  strength, however able to antigravity, neurovascularly intact. Motrin given. CT C-spine moderate spondylosis at C4-C6. Neurology consulted, recommending MRI. Patient placed in observation unit for MRI and possible neurosurgery consult as needed. RADIOLOGY:  CT CERVICAL SPINE WO CONTRAST   Final Result   No acute osseous abnormality of the cervical spine. Moderate spondylosis most significant at C4-C6. EMERGENCY DEPARTMENT COURSE:  ED Course as of 12/19/22 1654   Mon Dec 19, 2022   1431 CT C spine  No acute osseous abnormality of the cervical spine. Moderate spondylosis most significant at C4-C6. [EM]   2985 Patient declined Motrin [EM]   1696 Neurology recommending MRI [EM]      ED Course User Index  [EM] Catracho Mistry MD       No notes of EC Admission Criteria type on file. PROCEDURES:  None    CONSULTS:  IP CONSULT TO NEUROLOGY    CRITICAL CARE:  None    FINAL IMPRESSION      1.  Right arm pain          DISPOSITION / PLAN     DISPOSITION Admitted 12/19/2022 04:53:51 PM      PATIENT REFERRED TO:  [address] [place], [place] [id number] [phone]  Schedule an appointment as soon as possible for a visit   For follow up    [facility] ED  [address]  Go to   As needed    DISCHARGE MEDICATIONS:  New Prescriptions    No medications on file       Catracho Mistry MD  Emergency Medicine Resident    (Please note that portions of thisnote were completed with a voice recognition program.  Efforts were made to edit the dictations but occasionally words are mis-transcribed.)        Catracho Mistry MD  Resident  12/19/22 8625

## 2022-12-19 NOTE — ED NOTES
The following labs were labeled with appropriate pt sticker and tubed to lab:     [] Blue     [] Lavender   [] on ice  [] Green/yellow  [] Green/black [] on ice  [] Weinberg Phillip  [] on ice  [] Yellow  [] Red  [] Type/ Screen  [] ABG  [] VBG    [x] COVID-19 swab    [x] Rapid  [] PCR  [] Flu swab  [] Peds Viral Panel     [] Urine Sample  [] Fecal Sample  [] Pelvic Cultures  [] Blood Cultures  [] X 2  [] STREP Cultures       Frandy Stain, LPN  89/03/92 3317

## 2022-12-20 VITALS
DIASTOLIC BLOOD PRESSURE: 70 MMHG | TEMPERATURE: 97.3 F | SYSTOLIC BLOOD PRESSURE: 107 MMHG | HEART RATE: 67 BPM | OXYGEN SATURATION: 99 % | RESPIRATION RATE: 19 BRPM

## 2022-12-20 LAB
ABSOLUTE EOS #: 0.1 K/UL (ref 0–0.44)
ABSOLUTE IMMATURE GRANULOCYTE: <0.03 K/UL (ref 0–0.3)
ABSOLUTE LYMPH #: 1.33 K/UL (ref 1.1–3.7)
ABSOLUTE MONO #: 0.44 K/UL (ref 0.1–1.2)
ANION GAP SERPL CALCULATED.3IONS-SCNC: 9 MMOL/L (ref 9–17)
BASOPHILS # BLD: 1 % (ref 0–2)
BASOPHILS ABSOLUTE: 0.05 K/UL (ref 0–0.2)
BUN BLDV-MCNC: 18 MG/DL (ref 6–20)
CALCIUM SERPL-MCNC: 8.4 MG/DL (ref 8.6–10.4)
CHLORIDE BLD-SCNC: 111 MMOL/L (ref 98–107)
CO2: 20 MMOL/L (ref 20–31)
CREAT SERPL-MCNC: 0.81 MG/DL (ref 0.5–0.9)
EOSINOPHILS RELATIVE PERCENT: 2 % (ref 1–4)
GFR SERPL CREATININE-BSD FRML MDRD: >60 ML/MIN/1.73M2
GLUCOSE BLD-MCNC: 93 MG/DL (ref 70–99)
HCT VFR BLD CALC: 41.8 % (ref 36.3–47.1)
HEMOGLOBIN: 13 G/DL (ref 11.9–15.1)
IMMATURE GRANULOCYTES: 0 %
LYMPHOCYTES # BLD: 33 % (ref 24–43)
MCH RBC QN AUTO: 30 PG (ref 25.2–33.5)
MCHC RBC AUTO-ENTMCNC: 31.1 G/DL (ref 28.4–34.8)
MCV RBC AUTO: 96.3 FL (ref 82.6–102.9)
MONOCYTES # BLD: 11 % (ref 3–12)
NRBC AUTOMATED: 0 PER 100 WBC
PDW BLD-RTO: 13.8 % (ref 11.8–14.4)
PLATELET # BLD: 347 K/UL (ref 138–453)
PMV BLD AUTO: 8.8 FL (ref 8.1–13.5)
POTASSIUM SERPL-SCNC: 4 MMOL/L (ref 3.7–5.3)
RBC # BLD: 4.34 M/UL (ref 3.95–5.11)
SEG NEUTROPHILS: 53 % (ref 36–65)
SEGMENTED NEUTROPHILS ABSOLUTE COUNT: 2.17 K/UL (ref 1.5–8.1)
SODIUM BLD-SCNC: 140 MMOL/L (ref 135–144)
WBC # BLD: 4.1 K/UL (ref 3.5–11.3)

## 2022-12-20 PROCEDURE — 2580000003 HC RX 258: Performed by: STUDENT IN AN ORGANIZED HEALTH CARE EDUCATION/TRAINING PROGRAM

## 2022-12-20 PROCEDURE — 96372 THER/PROPH/DIAG INJ SC/IM: CPT

## 2022-12-20 PROCEDURE — 6360000002 HC RX W HCPCS: Performed by: STUDENT IN AN ORGANIZED HEALTH CARE EDUCATION/TRAINING PROGRAM

## 2022-12-20 PROCEDURE — 36415 COLL VENOUS BLD VENIPUNCTURE: CPT

## 2022-12-20 PROCEDURE — G0378 HOSPITAL OBSERVATION PER HR: HCPCS

## 2022-12-20 PROCEDURE — 80048 BASIC METABOLIC PNL TOTAL CA: CPT

## 2022-12-20 PROCEDURE — 99225 PR SBSQ OBSERVATION CARE/DAY 25 MINUTES: CPT | Performed by: PSYCHIATRY & NEUROLOGY

## 2022-12-20 PROCEDURE — 85025 COMPLETE CBC W/AUTO DIFF WBC: CPT

## 2022-12-20 RX ORDER — IBUPROFEN 400 MG/1
400 TABLET ORAL 3 TIMES DAILY PRN
Qty: 60 TABLET | Refills: 2 | Status: SHIPPED | OUTPATIENT
Start: 2022-12-20

## 2022-12-20 RX ORDER — PREDNISONE 20 MG/1
40 TABLET ORAL DAILY
Qty: 10 TABLET | Refills: 0 | Status: SHIPPED | OUTPATIENT
Start: 2022-12-20 | End: 2022-12-25

## 2022-12-20 RX ADMIN — SODIUM CHLORIDE, PRESERVATIVE FREE 10 ML: 5 INJECTION INTRAVENOUS at 10:00

## 2022-12-20 RX ADMIN — ENOXAPARIN SODIUM 40 MG: 100 INJECTION SUBCUTANEOUS at 10:00

## 2022-12-20 RX ADMIN — SODIUM CHLORIDE, PRESERVATIVE FREE 10 ML: 5 INJECTION INTRAVENOUS at 00:51

## 2022-12-20 ASSESSMENT — PAIN SCALES - GENERAL: PAINLEVEL_OUTOF10: 0

## 2022-12-20 NOTE — H&P
[facility]  CDU / OBSERVATION eNCOUnter  Resident Note     Pt Name: Juli Mcintosh  MRN: [id number]  [place] 1970  Date of evaluation: 12/20/22  Patient's PCP is : No primary care provider on file. CHIEF COMPLAINT       Chief Complaint   Patient presents with    Arm Pain     Right, just started today         HISTORY OF PRESENT ILLNESS    Juli Mcintosh is a 47 y.o. female who presents from [facility] with RUE numbness and weakness after being handcuffed. Patient also complains of tingling that goes into her neck. Patient complains of pain in arm when trying to bend it. She reports \"shooting\" pain from right side of neck into finger tips. Patient denies any loss of bowel or bladder function. No weakness or numbness to BLE. MRI was performed and revealed degenerative thecal sac narrowing and neural foraminal stenosis at C5-C6 and C6-C7. REVIEW OF SYSTEMS     General ROS - No fevers, No malaise   Ophthalmic ROS - No discharge, No changes in vision  ENT ROS -  No sore throat, No rhinorrhea,   Respiratory ROS - no shortness of breath, no cough, no  wheezing  Cardiovascular ROS - No chest pain, no dyspnea on exertion  Gastrointestinal ROS - No abdominal pain, no nausea or vomiting, no change in bowel habits, no black or bloody stools  Genito-Urinary ROS - No dysuria, trouble voiding, or hematuria  Musculoskeletal ROS - No myalgias, No arthalgias  Neurological ROS - No headache, no dizziness/lightheadedness, No focal weakness, numbness right hand, decreased strength   Dermatological ROS - No lesions, No rash     (PQRS) Advance directives on face sheet per hospital policy. No change unless specifically mentioned in chart    [address]    has a past medical history of Heroin abuse ([facility] [address] and Ovarian cyst.    I have reviewed the past medical history with the patient and it is pertinent to this complaint.       SURGICAL HISTORY      has a past surgical history that includes knee surgery; Wrist surgery; Tonsillectomy; Dilation and curettage of uterus; Ectopic pregnancy surgery; and Sternum fracture surgery. I have reviewed and agree with Surgical History entered and it is not pertinent to this complaint. CURRENT MEDICATIONS     ibuprofen (ADVIL;MOTRIN) tablet 800 mg, Once  sodium chloride flush 0.9 % injection 5-40 mL, 2 times per day  sodium chloride flush 0.9 % injection 5-40 mL, PRN  0.9 % sodium chloride infusion, PRN  enoxaparin (LOVENOX) injection 40 mg, Daily  acetaminophen (TYLENOL) tablet 650 mg, Q4H PRN  ondansetron (ZOFRAN-ODT) disintegrating tablet 4 mg, Q8H PRN   Or  ondansetron (ZOFRAN) injection 4 mg, Q6H PRN        All medication charted and reviewed. ALLERGIES     has No Known Allergies. FAMILY HISTORY     has no family status information on file. family history is not on file. The patient denies any pertinent family history. I have reviewed and agree with the family history entered. I have reviewed the Family History and it is not significant to the case    SOCIAL HISTORY      reports that she has been smoking cigarettes. She has been smoking an average of 1 pack per day. She has never used smokeless tobacco. She reports that she does not drink alcohol and does not use drugs. I have reviewed and agree with all Social.  There are concerns for substance abuse/use. PHYSICAL EXAM     INITIAL VITALS:  oral temperature is 97.3 °F (36.3 °C). Her blood pressure is 107/70 and her pulse is 67. Her respiration is 19 and oxygen saturation is 99%.       CONSTITUTIONAL: AOx4, no apparent distress, appears stated age    HEAD: normocephalic, atraumatic   EYES: PERRLA, EOMI    ENT: moist mucous membranes, uvula midline   NECK: supple, symmetric   BACK: symmetric   LUNGS: clear to auscultation bilaterally   CARDIOVASCULAR: regular rate and rhythm, no murmurs, rubs or gallops   ABDOMEN: soft, non-tender, non-distended with normal active bowel sounds   NEUROLOGIC:  MAEx4, decreased sensation right upper arm, decreased strength in bilateral upper extremities, BLE normal   MUSCULOSKELETAL: no clubbing, cyanosis or edema   SKIN: no rash or wounds       DIFFERENTIAL DIAGNOSIS/MDM:     Right upper arm pain / numbness / decreased motor   - Cervical radiculopathy   - Epidural abscess   - Degenerative disc disease       DIAGNOSTIC RESULTS   RADIOLOGY:   I directly visualized the following  images and reviewed the radiologist interpretations:    CT CERVICAL SPINE WO CONTRAST    Result Date: 12/19/2022  EXAMINATION: CT OF THE CERVICAL SPINE WITHOUT CONTRAST, 12/19/2022 2:11 pm TECHNIQUE: CT of the cervical spine was performed without the administration of intravenous contrast. Multiplanar reformatted images are provided for review. Automated exposure control, iterative reconstruction, and/or weight based adjustment of the mA/kV was utilized to reduce the radiation dose to as low as reasonably achievable. COMPARISON: None HISTORY: ORDERING SYSTEM PROVIDED HISTORY:  RUE pain radiating to neck. TECHNOLOGIST PROVIDED HISTORY: RUE pain radiating to neck. Decision Support Exception - unselect if not a suspected or confirmed emergency medical condition->Emergency Medical Condition (MA) Is the patient pregnant? No FINDINGS: BONES/ALIGNMENT: There is no acute fracture or traumatic malalignment. DEGENERATIVE CHANGES: Multilevel mild-to-moderate facet and uncinate hypertrophy. Disc space narrowing is most significant at C4-C6. Marginal osteophytes in this location. SOFT TISSUES: There is no prevertebral soft tissue swelling. No acute osseous abnormality of the cervical spine. Moderate spondylosis most significant at C4-C6.      MRI CERVICAL SPINE WO CONTRAST    Result Date: 12/19/2022  EXAMINATION: MRI OF THE CERVICAL SPINE WITHOUT CONTRAST 12/19/2022 6:53 pm TECHNIQUE: Multiplanar multisequence MRI of the cervical spine was performed without the administration of intravenous contrast. COMPARISON: None. HISTORY: ORDERING SYSTEM PROVIDED HISTORY: left arm pain and numbness with neck pain. TECHNOLOGIST PROVIDED HISTORY: left arm pain and numbness with neck pain. Reason for Exam: Left arm pain and numbness with neck pain. FINDINGS: BONES/ALIGNMENT: The exam is degraded by motion artifact. There is a normal cervical lordosis. No acute fracture or traumatic subluxation is identified. Moderate degenerative disc disease of the lower cervical spine is present. Normal expected signal voids are seen within the vertebral arteries. SPINAL CORD: No abnormal cord signal is seen. SOFT TISSUES: No paraspinal mass identified. C2-C3: There is no significant disc protrusion, spinal canal stenosis or neural foraminal narrowing. C3-C4: Facet hypertrophy, without significant stenosis. C4-C5: Disc osteophyte complex and facet hypertrophy, without significant stenosis. C5-C6: Disc osteophyte complex, ligamentum flavum thickening, and facet hypertrophy resulting in moderate to severe narrowing of the thecal sac and moderate narrowing of the right neural foramen. There is severe narrowing of the left neural foramen. C6-C7: Disc osteophyte complex and facet hypertrophy resulting in severe left and moderate to severe right-sided neural foraminal stenosis. There is moderate narrowing of the thecal sac. C7-T1: There is no significant disc protrusion, spinal canal stenosis or neural foraminal narrowing. Motion degraded exam. Degenerative thecal sac narrowing and neural foraminal stenosis at C5-C6 and C6-C7. LABS:  I have reviewed and interpreted all available lab results.   Labs Reviewed   BASIC METABOLIC PANEL - Abnormal; Notable for the following components:       Result Value    Calcium 8.4 (*)     Chloride 111 (*)     All other components within normal limits   COVID-19, RAPID   CBC WITH AUTO DIFFERENTIAL       CDU IMPRESSION / Isi Yanira is a 47 y.o. female who presents with right upper extremity numbness    Right upper extremity numbness  Neurology and neurosurgery consulted  MRI cervical spine without contrast  Neurology recommends outpatient EMG and follow-up with them. Neurosurgery would like patient to follow-up with them outpatient 4 to 6 weeks after receiving the EMG. Continue home medications and pain control  Monitor vitals, labs, and imaging    DISPO: Medically cleared for discharge    CONSULTS:    [address] TO NEUROSURGERY    PROCEDURES:  Not indicated       PATIENT REFERRED TO:    [facility]  [address] [place] [id number] [phone]  Schedule an appointment as soon as possible for a visit   For follow up    [facility]  [address] [place] [address]  [phone]  Go to   As needed    Chino Victor MD  [facility] [address] [place] [address]  [phone]    Follow up in 4 week(s)  Outpatient EMG for b/l upper (right hand numbness and pain). If positive for radiculopathy then NSG may do ACDF C-spine (as per Dr. Dorothy Hurtado)      --  Loretta Bunn MD   Emergency Medicine Resident     This dictation was generated by voice recognition computer software. Although all attempts are made to edit the dictation for accuracy, there may be errors in the transcription that are not intended.

## 2022-12-20 NOTE — ED NOTES
Dr Layne Peaks at bedside to assess patient. Patient awakened upon verbal stimuli and light sternal rub.       Racheal Lopez, SHARRONN  75/81/68 [id number]

## 2022-12-20 NOTE — CONSULTS
Occupational History    Not on file   Tobacco Use    Smoking status: Some Days     Packs/day: 1.00     Types: Cigarettes    Smokeless tobacco: Never   Substance and Sexual Activity    Alcohol use: Never    Drug use: Never    Sexual activity: Never   Other Topics Concern    Not on file   Social History Narrative    Not on file     Social Determinants of Health     Financial Resource Strain: Not on file   Food Insecurity: Not on file   Transportation Needs: Not on file   Physical Activity: Not on file   Stress: Not on file   Social Connections: Not on file   Intimate Partner Violence: Not on file   Housing Stability: Not on file       Family History:   No family history on file. Allergies:  Patient has no known allergies.     Home Medications:  Prior to Admission medications    Not on File       Current Medications:   Current Facility-Administered Medications: ibuprofen (ADVIL;MOTRIN) tablet 800 mg, 800 mg, Oral, Once  sodium chloride flush 0.9 % injection 5-40 mL, 5-40 mL, IntraVENous, 2 times per day  sodium chloride flush 0.9 % injection 5-40 mL, 5-40 mL, IntraVENous, PRN  0.9 % sodium chloride infusion, , IntraVENous, PRN  enoxaparin (LOVENOX) injection 40 mg, 40 mg, SubCUTAneous, Daily  acetaminophen (TYLENOL) tablet 650 mg, 650 mg, Oral, Q4H PRN  ondansetron (ZOFRAN-ODT) disintegrating tablet 4 mg, 4 mg, Oral, Q8H PRN **OR** ondansetron (ZOFRAN) injection 4 mg, 4 mg, IntraVENous, Q6H PRN    REVIEW OF SYSTEMS:       CONSTITUTIONAL: negative for fatigue and malaise   EYES: negative for double vision and photophobia    HEENT: negative for tinnitus and sore throat   RESPIRATORY: negative for cough, shortness of breath   CARDIOVASCULAR: negative for chest pain, palpitations   GASTROINTESTINAL: negative for nausea, vomiting   GENITOURINARY: negative for incontinence   MUSCULOSKELETAL: + posterior neck pain, numbness and weakness    NEUROLOGICAL: negative for seizures   PSYCHIATRIC: negative for agitated     Review of systems otherwise negative.     PHYSICAL EXAM:       /70   Pulse 67   Temp 97.3 °F (36.3 °C) (Oral)   Resp 19   LMP  (LMP Unknown)   SpO2 99%       CONSTITUTIONAL: no apparent distress, appears stated age   HEAD: normocephalic, atraumatic   ENT: moist mucous membranes, uvula midline   NECK: supple, symmetric, + tenderness with palpation    BACK: without midline tenderness, step-offs or deformities   LUNGS: On room air    CARDIOVASCULAR: regular rate and rhythm   ABDOMEN: Soft, non-tender, non-distended with normal active bowel sounds   NEUROLOGIC:  EYE OPENING     Spontaneous - 4 [x]       To voice - 3 []       To pain - 2 []       None - 1 []    VERBAL RESPONSE     Appropriate, oriented - 5 [x]       Dazed or confused - 4 []       Syllables, expletives - 3 []       Grunts - 2 []       None - 1 []    MOTOR RESPONSE     Spontaneous, command - 6 [x]       Localizes pain - 5 []       Withdraws pain - 4 []       Abnormal flexion - 3 []       Abnormal extension - 2 []       None - 1 []            Total GCS: 15    Mental Status:  alert and orientated x3                Cranial Nerves:    cranial nerves II-XII are grossly intact    Motor Exam:    Drift:  absent  Tone:  normal    Motor exam is 5 out of 5 all extremities with the exception of see below   Motor exam is symmetrical 5 out of 5 lower extremities bilaterally  Right deltoid 4 out of 5  Right biceps 4 out of 5  Right triceps 4- out of 5  Right wrist flexion grade 4 out of 5  Right finger extension 4- out of 5  Right finger flexion 4- out of 5    Sensory:    Right Upper Extremity:  abnormal - numbness to entire RUE  Left Upper Extremity:  normal  Right Lower Extremity:  normal  Left Lower Extremity:  normal    Deep Tendon Reflexes:    Right Bicep:  2+  Left Bicep:  2+  Right Knee:  2+  Left Knee:  2+    Plantar Response:    Right:  downgoing  Left:  downgoing    Clonus:  absent  Rucker's:  absent    Gait:  normal   SKIN: no rash       LABS AND IMAGING: CBC with Differential:    Lab Results   Component Value Date/Time    WBC 4.1 12/20/2022 08:48 AM    RBC 4.34 12/20/2022 08:48 AM    HGB 13.0 12/20/2022 08:48 AM    HCT 41.8 12/20/2022 08:48 AM     12/20/2022 08:48 AM    MCV 96.3 12/20/2022 08:48 AM    MCH 30.0 12/20/2022 08:48 AM    MCHC 31.1 12/20/2022 08:48 AM    RDW 13.8 12/20/2022 08:48 AM    LYMPHOPCT 33 12/20/2022 08:48 AM    MONOPCT 11 12/20/2022 08:48 AM    BASOPCT 1 12/20/2022 08:48 AM    MONOSABS 0.44 12/20/2022 08:48 AM    LYMPHSABS 1.33 12/20/2022 08:48 AM    EOSABS 0.10 12/20/2022 08:48 AM    BASOSABS 0.05 12/20/2022 08:48 AM    DIFFTYPE NOT REPORTED 11/18/2021 04:18 PM     BMP:    Lab Results   Component Value Date/Time     12/20/2022 08:48 AM    K 4.0 12/20/2022 08:48 AM     12/20/2022 08:48 AM    CO2 20 12/20/2022 08:48 AM    BUN 18 12/20/2022 08:48 AM    LABALBU 3.7 11/18/2021 04:18 PM    CREATININE 0.81 12/20/2022 08:48 AM    CALCIUM 8.4 12/20/2022 08:48 AM    GFRAA >60 11/18/2021 04:18 PM    LABGLOM >60 12/20/2022 08:48 AM    GLUCOSE 93 12/20/2022 08:48 AM       Radiology Review:    CT CERVICAL SPINE WO CONTRAST    Result Date: 12/19/2022  EXAMINATION: CT OF THE CERVICAL SPINE WITHOUT CONTRAST, 12/19/2022 2:11 pm TECHNIQUE: CT of the cervical spine was performed without the administration of intravenous contrast. Multiplanar reformatted images are provided for review. Automated exposure control, iterative reconstruction, and/or weight based adjustment of the mA/kV was utilized to reduce the radiation dose to as low as reasonably achievable. COMPARISON: None HISTORY: ORDERING SYSTEM PROVIDED HISTORY:  RUE pain radiating to neck. TECHNOLOGIST PROVIDED HISTORY: RUE pain radiating to neck. Decision Support Exception - unselect if not a suspected or confirmed emergency medical condition->Emergency Medical Condition (MA) Is the patient pregnant? No FINDINGS: BONES/ALIGNMENT: There is no acute fracture or traumatic malalignment. DEGENERATIVE CHANGES: Multilevel mild-to-moderate facet and uncinate hypertrophy. Disc space narrowing is most significant at C4-C6. Marginal osteophytes in this location. SOFT TISSUES: There is no prevertebral soft tissue swelling. No acute osseous abnormality of the cervical spine. Moderate spondylosis most significant at C4-C6. MRI CERVICAL SPINE WO CONTRAST    Result Date: 12/19/2022  EXAMINATION: MRI OF THE CERVICAL SPINE WITHOUT CONTRAST 12/19/2022 6:53 pm TECHNIQUE: Multiplanar multisequence MRI of the cervical spine was performed without the administration of intravenous contrast. COMPARISON: None. HISTORY: ORDERING SYSTEM PROVIDED HISTORY: left arm pain and numbness with neck pain. TECHNOLOGIST PROVIDED HISTORY: left arm pain and numbness with neck pain. Reason for Exam: Left arm pain and numbness with neck pain. FINDINGS: BONES/ALIGNMENT: The exam is degraded by motion artifact. There is a normal cervical lordosis. No acute fracture or traumatic subluxation is identified. Moderate degenerative disc disease of the lower cervical spine is present. Normal expected signal voids are seen within the vertebral arteries. SPINAL CORD: No abnormal cord signal is seen. SOFT TISSUES: No paraspinal mass identified. C2-C3: There is no significant disc protrusion, spinal canal stenosis or neural foraminal narrowing. C3-C4: Facet hypertrophy, without significant stenosis. C4-C5: Disc osteophyte complex and facet hypertrophy, without significant stenosis. C5-C6: Disc osteophyte complex, ligamentum flavum thickening, and facet hypertrophy resulting in moderate to severe narrowing of the thecal sac and moderate narrowing of the right neural foramen. There is severe narrowing of the left neural foramen. C6-C7: Disc osteophyte complex and facet hypertrophy resulting in severe left and moderate to severe right-sided neural foraminal stenosis. There is moderate narrowing of the thecal sac.  C7-T1: There is no significant disc protrusion, spinal canal stenosis or neural foraminal narrowing. Motion degraded exam. Degenerative thecal sac narrowing and neural foraminal stenosis at C5-C6 and C6-C7. ASSESSMENT AND PLAN:       Patient Active Problem List   Diagnosis    Right arm pain         A/P:  This is a 47 y.o. female with complaints of right upper extremity numbness, subjective weakness and pain. MRI Cervical spine degenerative thecal sac narrowing and neural foraminal stenosis at C5-C6 and C6-C7. Patient care will be discussed with attending, will reevaluated patient along with attending.      - No acute NS intervention at this time   - Neurosurgical intervention pending imaging findings and review by attending neurosurgeon   - Further recommendations pending examination by Jayson Donald   - CTLS recommendations: none    - Neuro checks per protocol  - Hold all antiplatelets and anticoagulants  - Ok to begin prophylactic anticoagulation from neurosurgery stand point. However, we recommend careful evaluation of all other risk factors associated with anticoagulation therapy as applied to this patient's medical condition      Additional recommendations may follow    Please contact neurosurgery with any changes in patients neurologic status. Thank you for your consult.        GORDON Gannon - CNP     12/20/2022  11:24 AM

## 2022-12-20 NOTE — ED NOTES
Dr Dylan Matos notified of patient low BP and asked to assess patient.      Nikole Motley, SHARRONN  20/28/22 [id number]

## 2022-12-20 NOTE — PROGRESS NOTES
NS Update:       seen and examined patient. Follow up in 4-6 weeks after completion of EMG to determine if surgical option is warranted. Message sent to clinic to arrange.

## 2022-12-20 NOTE — PROGRESS NOTES
[facility]  CDU / OBSERVATION ENCOUNTER  ATTENDING NOTE       I performed a history and physical examination of the patient and discussed management with the resident or midlevel provider. I reviewed the resident or midlevel provider's note and agree with the documented findings and plan of care. Any areas of disagreement are noted on the chart. I was personally present for the key portions of any procedures. I have documented in the chart those procedures where I was not present during the key portions. I have reviewed the nurses notes. I agree with the chief complaint, past medical history, past surgical history, allergies, medications, social and family history as documented unless otherwise noted below. The Family history, social history, and ROS are effectively unchanged since admission unless noted elsewhere in the chart. Patient presents from senior care after being arrested. Patient complains of right arm pain and tingling and burning sensation with weakness after being handcuffed. Patient has a history of cervical radiculopathy. Patient has prior cervical spine issues concerning for mild to moderate stenosis. Patient was reevaluated with MRI and CT scans today. MRI showed       Motion degraded exam.       Degenerative thecal sac narrowing and neural foraminal stenosis at C5-C6 and   C6-C7. Patient admitted due to arm tingling and numbness. Patient had MRI done yesterday. MRI does show findings in cervical spine. Patient admitted for neurosurgical evaluation. Events from last night noted. Patient had 2 blood pressures reading low while patient was sleeping. Patient assessed by neurology and neurosurgery. Patient for outpatient EMG. Will give short burst of steroids and have patient follow-up as outpatient.     Bertell Curling MD  Attending Emergency  Physician

## 2022-12-20 NOTE — PROGRESS NOTES
CLINICAL PHARMACY NOTE: MEDS TO BEDS    Total # of Prescriptions Filled: 2   The following medications were delivered to the patient:  Prednisone  ibuprofen    Additional Documentation:

## 2022-12-20 NOTE — ED NOTES
Dr Mary Ann Argueta notified of low BP's, states he is okay with it as patient original BP was not super high, she is sleeping and still getting fluids.       Tessa Caraballo, SHARRONN  24/73/14 7829

## 2022-12-20 NOTE — PROGRESS NOTES
[facility] Neurology   [address] [facility]    Neurology Progress Note             Date:   12/20/2022  Patient name: Lon Redmond  Date of admission:  12/19/2022 12:50 PM  MRN:   [id number]  Account:  [de-identified]  YOB: 1970  PCP:    No primary care provider on file. Room:   [address] [place] [address]  Code Status:    Full Code    Chief Complaint:     Chief Complaint   Patient presents with    Arm Pain     Right, just started today       Brief History of Present Illness:     47year old female presented from longterm after being arrested this morning and while being handcuff she had complains of right arm pain, tingling burning sensation with neck pain and having weakness. She was brought to the ED for eval where ED was concerned of unexplained right arm numbness without any clear dermatomal distribution complaining of weakness but able to antigravity. Patient had previous hx of cervical radiculopathy. Ct cervical spine wo contrast showed No acute osseous abnormality of the cervical spine. Moderate spondylosis most significant at C4-C6. On my eval, patient was asleep and on awakening she was drowsy. AAO x 3 without any cranial nerve deficit. No motor deficit noticed. Patient was able to antigravity and hold against resistance. Reflexes +3 in the triceps. Negative cochran sign. Patient complains of burning sensation in the whole hand unclear if it goes from neck to hand or otherway around. In the fingers, patient had distirbution in the first thumb and first 2 finger on the palmar surface. Off-note, She had MRI C spine at [facility] in 2017 that showed Degenerative changes of the cervical spine between the C4-C5 and   C6-C7 levels with varying degrees of neural foraminal and central canal stenosis. Neural foraminal stenosis is most significant on the right at C5-C6.       Unclear what manipulation of hand and arm took place in the am and given history of cervical radiculopathy and MRI C spine in 2017 concerning for stenosis in the C5-C6, we would like to get MRI cervical spine wo contrast. Patient has history of drug use but denies having any drugs given patient mild confusion recommend UDS. Patient can be admitted under ED observation neurology will follow-up.     2022:     Past Medical History:     Past Medical History:   Diagnosis Date    Heroin abuse ([address]     Ovarian cyst         Past Surgical History:     Past Surgical History:   Procedure Laterality Date    DILATION AND CURETTAGE OF UTERUS      ECTOPIC PREGNANCY SURGERY      KNEE SURGERY      STERNUM FRACTURE SURGERY      TONSILLECTOMY      WRIST SURGERY          Medications Prior to Admission:     Prior to Admission medications    Not on File        Allergies:     Patient has no known allergies. Social History:     Tobacco:    reports that she has been smoking cigarettes. She has been smoking an average of 1 pack per day. She has never used smokeless tobacco.  Alcohol:      reports no history of alcohol use. Drug Use:  reports no history of drug use. Family History:     No family history on file. Review of Systems:     Review of Systems    Physical Exam:   /61   Pulse 67   Temp 98.2 °F (36.8 °C) (Oral)   Resp 18   LMP  (LMP Unknown)   SpO2 100%   Temp (24hrs), Av.2 °F (36.8 °C), Min:98.1 °F (36.7 °C), Max:98.2 °F (36.8 °C)    No results for input(s): POCGLU in the last 72 hours.   No intake or output data in the 24 hours ending 22 0818      Neurologic Exam     GENERAL  Appears comfortable and in no distress   HEENT  NC/ AT   HEART  S1 and S2 heard; palpation of pulses: radial pulse    NECK  Supple and no bruits heard   MENTAL STATUS:  Alert, oriented, intact memory, no confusion, normal speech, normal language, no hallucination or delusion   CRANIAL NERVES: II     -      Visual fields intact to confrontation  III,IV,VI -  PERR, EOMs full, no ptosis  V     -     Normal facial sensation   VII    - Normal facial symmetry  VIII   -     Intact hearing   IX,X -     Symmetrical palate  XI    -     Symmetrical shoulder shrug  XII   -     Midline tongue, no atrophy    MOTOR FUNCTION: RUE: Significant for good strength of grade 4+/5 in proximal and distal muscle groups   LUE: Significant for good strength of grade 5/5 in proximal and distal muscle groups   RLE: Significant for good strength of grade 5/5 in proximal and distal muscle groups   LLE: Significant for good strength of grade 5/5 in proximal and distal muscle groups      Normal bulk, normal tone and no involuntary movements, no tremor   SENSORY FUNCTION:  Normal touch, normal pinprick, normal vibration, normal proprioception     Complaints of burning sensation/pain in the right upper extremity. CEREBELLAR FUNCTION:  Intact fine motor control over upper limbs and lower limbs   REFLEX FUNCTION:  Symmetric in upper and lower extremities, no Babinski sign   STATION and GAIT  Normal gait and tandem station, normal tip toes and heel walking       Investigations:      Laboratory Testing:  Recent Results (from the past 24 hour(s))   COVID-19, Rapid    Collection Time: 12/19/22  5:44 PM    Specimen: Nasopharyngeal Swab   Result Value Ref Range    Specimen Description . NASOPHARYNGEAL SWAB     SARS-CoV-2, Rapid Not Detected Not Detected     No results for input(s): WBC, RBC, HGB, HCT, MCV, MCH, MCHC, RDW, PLT, MPV in the last 72 hours. No results for input(s): NA, K, CL, CO2, BUN, CREATININE, GLUCOSE, CALCIUM, PROT, LABALBU, BILITOT, ALKPHOS, AST, ALT in the last 72 hours. Hemoglobin A1C   Date Value Ref Range Status   11/18/2021 5.9 4.0 - 6.0 % Final     CT CERVICAL SPINE WO CONTRAST    Result Date: 12/19/2022  EXAMINATION: CT OF THE CERVICAL SPINE WITHOUT CONTRAST, 12/19/2022 2:11 pm TECHNIQUE: CT of the cervical spine was performed without the administration of intravenous contrast. Multiplanar reformatted images are provided for review.  Automated exposure control, iterative reconstruction, and/or weight based adjustment of the mA/kV was utilized to reduce the radiation dose to as low as reasonably achievable. COMPARISON: None HISTORY: ORDERING SYSTEM PROVIDED HISTORY:  RUE pain radiating to neck. TECHNOLOGIST PROVIDED HISTORY: RUE pain radiating to neck. Decision Support Exception - unselect if not a suspected or confirmed emergency medical condition->Emergency Medical Condition (MA) Is the patient pregnant? No FINDINGS: BONES/ALIGNMENT: There is no acute fracture or traumatic malalignment. DEGENERATIVE CHANGES: Multilevel mild-to-moderate facet and uncinate hypertrophy. Disc space narrowing is most significant at C4-C6. Marginal osteophytes in this location. SOFT TISSUES: There is no prevertebral soft tissue swelling. No acute osseous abnormality of the cervical spine. Moderate spondylosis most significant at C4-C6. MRI CERVICAL SPINE WO CONTRAST    Result Date: 12/19/2022  EXAMINATION: MRI OF THE CERVICAL SPINE WITHOUT CONTRAST 12/19/2022 6:53 pm TECHNIQUE: Multiplanar multisequence MRI of the cervical spine was performed without the administration of intravenous contrast. COMPARISON: None. HISTORY: ORDERING SYSTEM PROVIDED HISTORY: left arm pain and numbness with neck pain. TECHNOLOGIST PROVIDED HISTORY: left arm pain and numbness with neck pain. Reason for Exam: Left arm pain and numbness with neck pain. FINDINGS: BONES/ALIGNMENT: The exam is degraded by motion artifact. There is a normal cervical lordosis. No acute fracture or traumatic subluxation is identified. Moderate degenerative disc disease of the lower cervical spine is present. Normal expected signal voids are seen within the vertebral arteries. SPINAL CORD: No abnormal cord signal is seen. SOFT TISSUES: No paraspinal mass identified. C2-C3: There is no significant disc protrusion, spinal canal stenosis or neural foraminal narrowing. C3-C4: Facet hypertrophy, without significant stenosis. C4-C5: Disc osteophyte complex and facet hypertrophy, without significant stenosis. C5-C6: Disc osteophyte complex, ligamentum flavum thickening, and facet hypertrophy resulting in moderate to severe narrowing of the thecal sac and moderate narrowing of the right neural foramen. There is severe narrowing of the left neural foramen. C6-C7: Disc osteophyte complex and facet hypertrophy resulting in severe left and moderate to severe right-sided neural foraminal stenosis. There is moderate narrowing of the thecal sac. C7-T1: There is no significant disc protrusion, spinal canal stenosis or neural foraminal narrowing. Motion degraded exam. Degenerative thecal sac narrowing and neural foraminal stenosis at C5-C6 and C6-C7. Assessment :      Primary Problem  Right arm pain    Active Hospital Problems    Diagnosis Date Noted    Right arm pain [M79.601] 12/19/2022     Priority: Medium       47year old female presented from skilled nursing after being arrested this morning and while being handcuff she had complains of right arm pain, tingling burning sensation with neck pain and having weakness. Right hand numbness/tingling with subjective weakness   Hx of cervical radiculopathy  Unclear manipulation of RUE  MRI C-spine showed neural foraminal stenosis at C5-C6 and C6-C7. Plan:     MRI cervical spine wo contrast: Degenerative thecal sac narrowing and neural foraminal stenosis at C5-C6 and C6-C7. Inpatient consult to neurosurgery: An outpatient EMG is recommended. If it confirms a radiculopathy we have discussed ACDF at C5-6 and C6-7. Recommend motrin as needed for home. Outpatient EMG. Okay to be discharged. Outpatient follow-up with neurology. Follow-up further recommendations after discussing the case with attending  The plan was discussed with the patient, patient's family and the medical staff.    Consultations:   IP CONSULT TO NEUROLOGY    Patient is admitted as inpatient status because of co-morbidities listed above, severity of signs and symptoms as outlined, requirement for current medical therapies and most importantly because of direct risk to patient if care not provided in a hospital setting. Lily Rodriguez MD  Neurology Resident PGY-3   12/20/2022  8:18 AM    Copy sent to Dr. Campa primary care provider on file.

## 2022-12-20 NOTE — CARE COORDINATION
Case Management Assessment  Initial Evaluation    Date/Time of Evaluation: 12/20/2022 10:18 AM  Assessment Completed by: Bárbara Gee RN    If patient is discharged prior to next notation, then this note serves as note for discharge by case management. Patient Name: Josué Menendez                   YOB: 1970  Diagnosis: Right arm pain [M79.601]                   Date / Time: 12/19/2022 12:50 PM    Patient Admission Status: Observation   Readmission Risk (Low < 19, Mod (19-27), High > 27): No data recorded  Current PCP: No primary care provider on file. PCP verified by CM? (none)      History Provided by: Patient  Patient Orientation: Alert and Oriented    Patient Cognition: Alert    Hospitalization in the last 30 days (Readmission):  No    If yes, Readmission Assessment in CM Navigator will be completed. Advance Directives:      Code Status: Full Code   Patient's Primary Decision Maker is:  self      Discharge Planning:    Patient lives with: Family Members Type of Home: House  Primary Care Giver: Self  Patient Support Systems include: Parent   Current Financial resources: Medicaid  Current community resources: None  Current services prior to admission: None                       Type of Home Care services:  None    ADLS  Prior functional level: Independent in ADLs/IADLs  Current functional level:  Independent in ADLs/IADLs    PT AM-PAC:   /24  OT AM-PAC:   /24    Family can provide assistance at DC:  yes    Plans to Return to Present Housing:  yes  Other Identified Issues/Barriers to RETURNING to current housing: none  Potential Assistance needed at discharge: N/A             Patient expects to discharge to: [address] [place] [address] for transportation at discharge: Family    Financial    Payor: [address] [place] [address]  [address] / Plan: [facility] [address] / Product Type: *No Product type* /         Potential assistance Purchasing Medications: No  Meds-to-Beds request: Yes      Shanda Mahoney [id number] - [place] [address]  Phone: [phone] Fax: [phone]      Notes:    Factors facilitating achievement of predicted outcomes: Family support    Barriers to discharge: pain    Additional Case Management Notes: home with family,   Made appt with [facility] 12/27 @11:45     The Plan for Transition of Care is related to the following treatment goals of Right arm pain [Y39.256]    IF APPLICABLE: The Patient and/or patient representative [facility] and her family were provided with a choice of provider and agrees with the discharge plan. Freedom of choice list with basic dialogue that supports the patient's individualized plan of care/goals and shares the quality data associated with the providers was provided to:     Patient Representative Name:       The Patient and/or Patient Representative Agree with the Discharge Plan?       Deedee Howard RN  Case Management Department

## 2022-12-21 NOTE — DISCHARGE SUMMARY
CDU Discharge Summary        Patient: Isidro Curry  YOB: 1970    MRN: [id number]   Acct: [de-identified]    Primary Care Physician: No primary care provider on file. Admit date:  12/19/2022 12:50 PM  Discharge date: 12/20/2022  5:32 PM      Discharge Diagnoses:     1.)  Radiculopathy right arm. Evaluated by neuro services and with MRI. Outpatient follow-up arranged. Follow-up:  Call today/tomorrow for a follow up appointment with No primary care provider on file. , or return to the Emergency Room with worsening symptoms    Stressed to patient the importance of following up with primary care doctor for further workup/management of symptoms. Pt verbalizes understanding and agrees with plan. Discharge Medication Changes:       Medication List        START taking these medications      ibuprofen 400 MG tablet  Commonly known as: ADVIL;MOTRIN  Take 1 tablet by mouth 3 times daily as needed for Pain     predniSONE 20 MG tablet  Commonly known as: DELTASONE  Take 2 tablets by mouth daily for 5 days               Where to Get Your Medications        These medications were sent to [address], [address] [place] [address], [address] [address]      Phone: [phone]   ibuprofen 400 MG tablet       You can get these medications from any pharmacy    Bring a paper prescription for each of these medications  predniSONE 20 MG tablet         Diet:  No diet orders on file, advance as tolerated     Activity:  As tolerated    Consultants: IP CONSULT TO NEUROLOGY  IP CONSULT TO NEUROSURGERY    Procedures:  Not indicated      Diagnostic Test:   Results for orders placed or performed during the hospital encounter of 12/19/22   COVID-19, Rapid    Specimen: Nasopharyngeal Swab   Result Value Ref Range    Specimen Description . NASOPHARYNGEAL SWAB     SARS-CoV-2, Rapid Not Detected Not Detected   CBC with Auto Differential   Result Value Ref Range WBC 4.1 3.5 - 11.3 k/uL    RBC 4.34 3.95 - 5.11 m/uL    Hemoglobin 13.0 11.9 - 15.1 g/dL    Hematocrit 41.8 36.3 - 47.1 %    MCV 96.3 82.6 - 102.9 fL    MCH 30.0 25.2 - 33.5 pg    MCHC 31.1 28.4 - 34.8 g/dL    RDW 13.8 11.8 - 14.4 %    Platelets 956 338 - 524 k/uL    MPV 8.8 8.1 - 13.5 fL    NRBC Automated 0.0 0.0 per 100 WBC    Seg Neutrophils 53 36 - 65 %    Lymphocytes 33 24 - 43 %    Monocytes 11 3 - 12 %    Eosinophils % 2 1 - 4 %    Basophils 1 0 - 2 %    Immature Granulocytes 0 0 %    Segs Absolute 2.17 1.50 - 8.10 k/uL    Absolute Lymph # 1.33 1.10 - 3.70 k/uL    Absolute Mono # 0.44 0.10 - 1.20 k/uL    Absolute Eos # 0.10 0.00 - 0.44 k/uL    Basophils Absolute 0.05 0.00 - 0.20 k/uL    Absolute Immature Granulocyte <0.03 0.00 - 0.30 k/uL   Basic Metabolic Panel   Result Value Ref Range    Glucose 93 70 - 99 mg/dL    BUN 18 6 - 20 mg/dL    Creatinine 0.81 0.50 - 0.90 mg/dL    Est, Glom Filt Rate >60 >60 mL/min/1.73m2    Calcium 8.4 (L) 8.6 - 10.4 mg/dL    Sodium 140 135 - 144 mmol/L    Potassium 4.0 3.7 - 5.3 mmol/L    Chloride 111 (H) 98 - 107 mmol/L    CO2 20 20 - 31 mmol/L    Anion Gap 9 9 - 17 mmol/L     CT CERVICAL SPINE WO CONTRAST    Result Date: 12/19/2022  EXAMINATION: CT OF THE CERVICAL SPINE WITHOUT CONTRAST, 12/19/2022 2:11 pm TECHNIQUE: CT of the cervical spine was performed without the administration of intravenous contrast. Multiplanar reformatted images are provided for review. Automated exposure control, iterative reconstruction, and/or weight based adjustment of the mA/kV was utilized to reduce the radiation dose to as low as reasonably achievable. COMPARISON: None HISTORY: ORDERING SYSTEM PROVIDED HISTORY:  RUE pain radiating to neck. TECHNOLOGIST PROVIDED HISTORY: RUE pain radiating to neck. Decision Support Exception - unselect if not a suspected or confirmed emergency medical condition->Emergency Medical Condition (MA) Is the patient pregnant?   No FINDINGS: BONES/ALIGNMENT: There is no acute fracture or traumatic malalignment. DEGENERATIVE CHANGES: Multilevel mild-to-moderate facet and uncinate hypertrophy. Disc space narrowing is most significant at C4-C6. Marginal osteophytes in this location. SOFT TISSUES: There is no prevertebral soft tissue swelling. No acute osseous abnormality of the cervical spine. Moderate spondylosis most significant at C4-C6. MRI CERVICAL SPINE WO CONTRAST    Result Date: 12/19/2022  EXAMINATION: MRI OF THE CERVICAL SPINE WITHOUT CONTRAST 12/19/2022 6:53 pm TECHNIQUE: Multiplanar multisequence MRI of the cervical spine was performed without the administration of intravenous contrast. COMPARISON: None. HISTORY: ORDERING SYSTEM PROVIDED HISTORY: left arm pain and numbness with neck pain. TECHNOLOGIST PROVIDED HISTORY: left arm pain and numbness with neck pain. Reason for Exam: Left arm pain and numbness with neck pain. FINDINGS: BONES/ALIGNMENT: The exam is degraded by motion artifact. There is a normal cervical lordosis. No acute fracture or traumatic subluxation is identified. Moderate degenerative disc disease of the lower cervical spine is present. Normal expected signal voids are seen within the vertebral arteries. SPINAL CORD: No abnormal cord signal is seen. SOFT TISSUES: No paraspinal mass identified. C2-C3: There is no significant disc protrusion, spinal canal stenosis or neural foraminal narrowing. C3-C4: Facet hypertrophy, without significant stenosis. C4-C5: Disc osteophyte complex and facet hypertrophy, without significant stenosis. C5-C6: Disc osteophyte complex, ligamentum flavum thickening, and facet hypertrophy resulting in moderate to severe narrowing of the thecal sac and moderate narrowing of the right neural foramen. There is severe narrowing of the left neural foramen. C6-C7: Disc osteophyte complex and facet hypertrophy resulting in severe left and moderate to severe right-sided neural foraminal stenosis.   There is moderate narrowing of the thecal sac. C7-T1: There is no significant disc protrusion, spinal canal stenosis or neural foraminal narrowing. Motion degraded exam. Degenerative thecal sac narrowing and neural foraminal stenosis at C5-C6 and C6-C7. Physical Exam:    General appearance - NAD, AOx 3    Lungs -CTAB, no R/R/R  Heart - RRR, no M/R/G  Abdomen - Soft, NT/ND  Neurological:  MAEx4, No focal motor deficit, sensory loss  Extremities - Cap refil <2 sec in all ext., no edema  Skin -warm, dry      Hospital Course:  Clinical course has improved, labs and imaging reviewed. Doroteo Anna originally presented to the hospital on 12/19/2022 12:50 PM with tingling and weakness to right upper extremity after being handcuffed. At that time it was determined that She required further observation and advanced neurologic imaging and evaluation by neurology and neurosurgery. Patient was with thecal sac narrowing and neuroforaminal impingement. Patient with radiculopathy. Evaluated by neurology and neurosurgery. Not found to have an acute surgical need. Started on prednisone and discharged for EMG as outpatient. Patient for follow-up with neurology and neurosurgery both. Patient understood discharge instructions and will continue treatment plan as outpatient. Anticipate reevaluation by neurology and follow-up with neurosurgical clinic as well. . Labs and imaging were followed daily. Imaging results as above. She is medically stable to be discharged. Disposition: Home    Patient stated that they will not drive themselves home from the hospital if they have gotten pain killers/ narcotics earlier that day and that they will arrange for transportation on their own or work with the  for a ride. Patient counseled NOT to drive while under the influence of narcotics/ pain killers. Condition: Good    Patient stable and ready for discharge home.  I have discussed plan of care with patient and they are in understanding. They were instructed to read discharge paperwork. All of their questions and concerns were addressed. Time Spent: 0 day      --  Fay Munoz MD  Emergency Medicine Attending Physician    This dictation was generated by voice recognition computer software. Although all attempts are made to edit the dictation for accuracy, there may be errors in the transcription that are not intended.

## 2023-02-27 ENCOUNTER — APPOINTMENT (OUTPATIENT)
Dept: GENERAL RADIOLOGY | Facility: CLINIC | Age: 53
End: 2023-02-27
Payer: COMMERCIAL

## 2023-02-27 ENCOUNTER — HOSPITAL ENCOUNTER (EMERGENCY)
Facility: CLINIC | Age: 53
Discharge: HOME | End: 2023-02-27
Attending: EMERGENCY MEDICINE
Payer: COMMERCIAL

## 2023-02-27 VITALS
SYSTOLIC BLOOD PRESSURE: 134 MMHG | TEMPERATURE: 98.4 F | HEART RATE: 92 BPM | RESPIRATION RATE: 18 BRPM | DIASTOLIC BLOOD PRESSURE: 80 MMHG | OXYGEN SATURATION: 98 % | WEIGHT: 168 LBS | BODY MASS INDEX: 27.12 KG/M2

## 2023-02-27 DIAGNOSIS — M54.2 NECK PAIN: Primary | ICD-10-CM

## 2023-02-27 DIAGNOSIS — M54.6 ACUTE BILATERAL THORACIC BACK PAIN: ICD-10-CM

## 2023-02-27 PROCEDURE — 72070 X-RAY EXAM THORAC SPINE 2VWS: CPT

## 2023-02-27 PROCEDURE — 99284 EMERGENCY DEPT VISIT MOD MDM: CPT

## 2023-02-27 PROCEDURE — 72040 X-RAY EXAM NECK SPINE 2-3 VW: CPT

## 2023-02-27 PROCEDURE — 96372 THER/PROPH/DIAG INJ SC/IM: CPT

## 2023-02-27 PROCEDURE — 6360000002 HC RX W HCPCS: Performed by: REGISTERED NURSE

## 2023-02-27 RX ORDER — KETOROLAC TROMETHAMINE 30 MG/ML
30 INJECTION, SOLUTION INTRAMUSCULAR; INTRAVENOUS ONCE
Status: COMPLETED | OUTPATIENT
Start: 2023-02-27 | End: 2023-02-27

## 2023-02-27 RX ADMIN — KETOROLAC TROMETHAMINE 30 MG: 30 INJECTION, SOLUTION INTRAMUSCULAR; INTRAVENOUS at 20:00

## 2023-02-27 ASSESSMENT — ENCOUNTER SYMPTOMS
DIARRHEA: 0
SHORTNESS OF BREATH: 0
COUGH: 0
BACK PAIN: 1
VOMITING: 0
NAUSEA: 0
ABDOMINAL PAIN: 0

## 2023-02-27 ASSESSMENT — PAIN SCALES - GENERAL: PAINLEVEL_OUTOF10: 10

## 2023-02-27 ASSESSMENT — PAIN DESCRIPTION - ORIENTATION: ORIENTATION: MID;UPPER

## 2023-02-27 ASSESSMENT — PAIN DESCRIPTION - LOCATION: LOCATION: NECK;BACK

## 2023-02-27 ASSESSMENT — PAIN DESCRIPTION - DESCRIPTORS: DESCRIPTORS: SHOOTING;SHARP

## 2023-02-27 ASSESSMENT — PAIN - FUNCTIONAL ASSESSMENT: PAIN_FUNCTIONAL_ASSESSMENT: 0-10

## 2023-02-28 NOTE — DISCHARGE INSTRUCTIONS
PLEASE RETURN TO THE EMERGENCY DEPARTMENT IMMEDIATELY if your symptoms worsen in anyway or in 1-2 days if not improved for re-evaluation. You should immediately return to the ER for symptoms such as worsening pain, abdominal pain, bloody stools, numbness or weakness to the arms or legs, difficulty with urination or defecation, difficulty with ambulation, fever, coolness or color change to the extremity, chest pain, shortness of breath, a feeling that you are going to pass out, light headed, dizziness. Take your medication as indicated and prescribed. If you are given an antibiotic then, make sure you get the prescription filled and take the antibiotics until finished. Please understand that at this time there is no evidence for a more serious underlying process, but that early in the process of an illness or injury, an emergency department workup can be falsely reassuring. You should contact your family doctor within the next 48 hours for a follow up appointment    Miracle James!!!    From [facility]) and [facility] Emergency Services    On behalf of the Emergency Department staff at [facility]), I would like to thank you for giving us the opportunity to address your health care needs and concerns. We hope that during your visit, our service was delivered in a professional and caring manner. Please keep [facility]) in mind as we walk with you down the path to your own personal wellness. Please expect an automated text message or email from us so we can ask a few questions about your health and progress. Based on your answers, a clinician may call you back to offer help and instructions. Please understand that early in the process of an illness or injury, an emergency department workup can be falsely reassuring. If you notice any worsening, changing or persistent symptoms please call your family doctor or return to the ER immediately.      Tell us how we did during your visit at [email or web address]   and let us know about your experience

## 2023-02-28 NOTE — ED NOTES
Pt presents to ED via stretcher by ems a&O x4. Pt states a month ago she slid down the steps and has been having back pain. Pt states over the past few days it pain has gotten worse and she feels like she cannot move her back or neck. Pt denies any new injury today.  Per EMS pt was being arrested when she then stated she was in severe pain and needed to go to the hospital.      Denia Van RN  02/27/23 1952

## 2023-02-28 NOTE — ED PROVIDER NOTES
[facility]  [facility]  Phone: [phone]        Pt Name: Leia Ortiz  MRN: [id number]  [place] 1970  Date of evaluation: 2/27/23    [address]       Chief Complaint   Patient presents with    Back Pain    Neck Pain       HISTORY OF PRESENT ILLNESS (Location/Symptom, Timing/Onset, Context/Setting, Quality, Duration, Modifying Factors, Severity)      Leia Ortiz is a 47 y.o. female with no pertinent PMH who presents to the ED via EMS with neck and upper back pain. Patient states that approximate month ago she slipped on some steps causing her to have neck and back pain. Patient states that over the last 2 days she started having neck and back pain and states that she called EMS today to be evaluated. Patient states that she was seen at [facility] 2 days ago but states that \"they did not do anything so I left\". She denies any new injuries. She denies any numbness or tingling. She denies any headache, dizziness, fevers or chills, chest pain, shortness of breath, Ephraim pain nausea vomiting or diarrhea. She is lying in the bed with even unlabored breaths is nontoxic-appearing moving all extremities purposefully. PAST MEDICAL / SURGICAL / SOCIAL / FAMILY HISTORY     PMH:  has a past medical history of Heroin abuse ([address] and Ovarian cyst.  Surgical History:  has a past surgical history that includes knee surgery; Wrist surgery; Tonsillectomy; Dilation and curettage of uterus; Ectopic pregnancy surgery; and Sternum fracture surgery. Social History:  reports that she has been smoking cigarettes. She has been smoking an average of 1 pack per day. She has never used smokeless tobacco. She reports that she does not drink alcohol and does not use drugs. Family History: has no family status information on file. family history is not on file. Psychiatric History: None    Allergies: Patient has no known allergies.     Home Medications:   Prior to Admission medications Medication Sig Start Date End Date Taking? Authorizing Provider   ibuprofen (ADVIL;MOTRIN) 400 MG tablet Take 1 tablet by mouth 3 times daily as needed for Pain 12/20/22   Crystal Quigley MD       REVIEW OF SYSTEMS  (2-9 systems for level 4, 10 ormore for level 5)      Review of Systems   Constitutional:  Negative for chills and fever. Respiratory:  Negative for cough and shortness of breath. Cardiovascular:  Negative for chest pain and palpitations. Gastrointestinal:  Negative for abdominal pain, diarrhea, nausea and vomiting. Genitourinary:  Negative for dysuria and hematuria. Musculoskeletal:  Positive for back pain and neck pain. Neurological:  Negative for dizziness, weakness, numbness and headaches. All other systems negative except as marked. PHYSICAL EXAM  (up to 7 for level 4, 8 or more for level 5)      INITIAL VITALS:  weight is 76.2 kg (168 lb). Her oral temperature is 98.4 °F (36.9 °C). Her blood pressure is 134/80 and her pulse is 92. Her respiration is 18 and oxygen saturation is 98%. Vital signs reviewed. Physical Exam  Constitutional:       General: She is not in acute distress. Appearance: Normal appearance. She is not ill-appearing or toxic-appearing. HENT:      Head: Normocephalic and atraumatic. Cardiovascular:      Rate and Rhythm: Normal rate and regular rhythm. Pulses: Normal pulses. Heart sounds: Normal heart sounds. Pulmonary:      Effort: Pulmonary effort is normal.      Breath sounds: Normal breath sounds. Abdominal:      General: There is no distension. Palpations: Abdomen is soft. Tenderness: There is no abdominal tenderness. There is no guarding. Musculoskeletal:         General: No tenderness. Cervical back: Normal range of motion and neck supple. Right lower leg: No edema. Left lower leg: No edema. Comments: Diffuse midline and paraspinal tenderness noted to cervical and thoracic spine.   No tenderness noted to lumbar spine. No step-offs or deformities noted to midline spine. Patient moving all extremities purposefully with no numbness or tingling noted. No weakness noted. Skin:     General: Skin is warm and dry. Capillary Refill: Capillary refill takes less than 2 seconds. Neurological:      General: No focal deficit present. Mental Status: She is alert. Sensory: No sensory deficit. Psychiatric:         Mood and Affect: Mood normal.         Behavior: Behavior normal.         DIFFERENTIAL DIAGNOSIS / MDM     Plan obtain x-rays of cervical and thoracic spine to rule out any bony abnormalities. We will provide patient with IM Toradol for pain control. X-rays do not show any acute findings. Patient was sleeping in bed on my reevaluation, did update patient on x-ray results, plan to discharge patient home to follow-up with PCP within 1 day. We will provide patient with information for PCP follow-up. Instructed patient take Tylenol ibuprofen as needed for pain, instructed use ice and heat for longer 20 minutes at a time. Instructed return with any continued pain, numbness tingling, headache, dizziness, chest pain or shortness of breath. Patient did verbalize understanding and agreement to this plan at this time. All question concerns were answered at this time. The patient presents with low back pain without signs of spinal cord compression, cauda equina syndrome, infection, aneurysm or other serious etiology. The patient is neurologically intact and appears stable for discharge. No skin lesions were seen. The pulses are 2/4 bilaterally. The motor is 5/5 bilaterally. The sensation is intact. Given the extremely low risk of these diagnoses further testing and evaluation for these possibilities does not appear to be indicated at this time.  The patient was advised to return to the Emergency Department for worsening pain, numbness, weakness, difficulty with urination or defecation, difficulty with ambulation, fever, abdominal pain, coolness or color change to the extremity. The patient understands that at this time there is no evidence for a more malignant underlying process, but the patient also understands that early in the process of an illness or injury, an emergency department workup can be falsely reassuring. Routine discharge counseling was given, and the patient understands that worsening, changing or persistent symptoms should prompt an immediate call or follow up with their primary physician or return to the emergency department. The importance of appropriate follow up was also discussed. I have reviewed the disposition diagnosis with the patient and or their family/guardian. I have answered their questions and given discharge instructions. They voiced understanding of these instructions and did not have any further questions or complaints. PLAN (LABS / IMAGING / EKG):  Orders Placed This Encounter   Procedures    XR CERVICAL SPINE (2-3 VIEWS)    XR THORACIC SPINE (2 VIEWS)       MEDICATIONS ORDERED:  Orders Placed This Encounter   Medications    ketorolac (TORADOL) injection 30 mg       Controlled Substances Monitoring:     DIAGNOSTIC RESULTS     EKG: All EKG's are interpreted by the Emergency Department Physician who either signs or Co-signs this chart in the absenceof a cardiologist.        RADIOLOGY: All images are read by the radiologist and their interpretations are reviewed. XR CERVICAL SPINE (2-3 VIEWS)   Final Result   Cervical spine: No fracture. Multilevel disc and facet degenerative disease most pronounced at C5-C6 and   C6-C7. Grade 1 anterolisthesis at C4-C5 and grade 1 retrolisthesis at C5-C6. Thoracic spine: No fracture. Multilevel endplate degenerative changes. XR THORACIC SPINE (2 VIEWS)   Final Result   Cervical spine: No fracture.       Multilevel disc and facet degenerative disease most pronounced at C5-C6 and C6-C7. Grade 1 anterolisthesis at C4-C5 and grade 1 retrolisthesis at C5-C6. Thoracic spine: No fracture. Multilevel endplate degenerative changes. No results found. LABS:  No results found for this visit on 02/27/23. EMERGENCY DEPARTMENT COURSE           Vitals:    Vitals:    02/27/23 1944 02/27/23 1946   BP:  134/80   Pulse:  92   Resp:  18   Temp: 98.4 °F (36.9 °C)    TempSrc: Oral    SpO2:  98%   Weight: 76.2 kg (168 lb)      -------------------------  BP: 134/80, Temp: 98.4 °F (36.9 °C), Heart Rate: 92, Resp: 18      RE-EVALUATION:  See ED Course notes above. CONSULTS:  None    PROCEDURES:  None    FINAL IMPRESSION      1. Neck pain    2. Acute bilateral thoracic back pain          DISPOSITION / PLAN     CONDITION ON DISPOSITION:   Stable for discharge. PATIENT REFERRED TO:  [facility] ED  [address] [facility] [address] [phone]    If symptoms worsen      Please call your PCP in one day for follow up. If you do not have a PCP you can Call [phone] ([phone]) to be established with a PCP.         DISCHARGE MEDICATIONS:  New Prescriptions    No medications on file       GORDON Sanchez - [address] [facility]   Emergency Medicine Nurse Practitioner    (Please note that portions of this note were completed with a voice recognition program.  Efforts were made to edit the dictations but occasionally words aremis-transcribed.)        GORDON Sanchez - CNP  02/27/23 [id number]

## 2024-01-08 ENCOUNTER — APPOINTMENT (OUTPATIENT)
Dept: CT IMAGING | Age: 54
End: 2024-01-08
Payer: COMMERCIAL

## 2024-01-08 ENCOUNTER — HOSPITAL ENCOUNTER (EMERGENCY)
Age: 54
Discharge: HOME | End: 2024-01-08
Attending: EMERGENCY MEDICINE
Payer: COMMERCIAL

## 2024-01-08 VITALS
SYSTOLIC BLOOD PRESSURE: 150 MMHG | HEART RATE: 87 BPM | HEIGHT: 66 IN | TEMPERATURE: 98.2 F | WEIGHT: 167 LBS | BODY MASS INDEX: 26.84 KG/M2 | RESPIRATION RATE: 16 BRPM | DIASTOLIC BLOOD PRESSURE: 87 MMHG | OXYGEN SATURATION: 97 %

## 2024-01-08 DIAGNOSIS — R13.10 DYSPHAGIA, UNSPECIFIED TYPE: ICD-10-CM

## 2024-01-08 DIAGNOSIS — M25.559 HIP PAIN, UNSPECIFIED LATERALITY: ICD-10-CM
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
## 2024-01-08 DIAGNOSIS — M54.50 LOW BACK PAIN, UNSPECIFIED BACK PAIN LATERALITY, UNSPECIFIED CHRONICITY, UNSPECIFIED WHETHER SCIATICA PRESENT: ICD-10-CM

## 2024-01-08 PROCEDURE — 99284 EMERGENCY DEPT VISIT MOD MDM: CPT

## 2024-01-08 PROCEDURE — 96372 THER/PROPH/DIAG INJ SC/IM: CPT

## 2024-01-08 PROCEDURE — 72131 CT LUMBAR SPINE W/O DYE: CPT

## 2024-01-08 PROCEDURE — 72128 CT CHEST SPINE W/O DYE: CPT

## 2024-01-08 PROCEDURE — 6360000002 HC RX W HCPCS: Performed by: EMERGENCY MEDICINE

## 2024-01-08 PROCEDURE — 72125 CT NECK SPINE W/O DYE: CPT

## 2024-01-08 RX ORDER — IBUPROFEN 600 MG/1
600 TABLET ORAL EVERY 6 HOURS PRN
Qty: 120 TABLET | Refills: 0 | Status: SHIPPED | OUTPATIENT
Start: 2024-01-08

## 2024-01-08 RX ORDER — KETOROLAC TROMETHAMINE 30 MG/ML
30 INJECTION, SOLUTION INTRAMUSCULAR; INTRAVENOUS ONCE
Status: COMPLETED | OUTPATIENT
Start: 2024-01-08 | End: 2024-01-08

## 2024-01-08 RX ORDER — MORPHINE SULFATE 4 MG/ML
4 INJECTION, SOLUTION INTRAMUSCULAR; INTRAVENOUS ONCE
Status: COMPLETED | OUTPATIENT
Start: 2024-01-08 | End: 2024-01-08

## 2024-01-08 RX ADMIN — KETOROLAC TROMETHAMINE 30 MG: 30 INJECTION, SOLUTION INTRAMUSCULAR; INTRAVENOUS at 21:42

## 2024-01-08 RX ADMIN — MORPHINE SULFATE 4 MG: 4 INJECTION, SOLUTION INTRAMUSCULAR; INTRAVENOUS at 21:42

## 2024-01-08 ASSESSMENT — PAIN - FUNCTIONAL ASSESSMENT: PAIN_FUNCTIONAL_ASSESSMENT: 0-10

## 2024-01-08 ASSESSMENT — PAIN SCALES - GENERAL
PAINLEVEL_OUTOF10: 9
PAINLEVEL_OUTOF10: 10

## 2024-01-09 NOTE — ED PROVIDER NOTES
CONSULTS ORDERED THIS ENCOUNTER:  None  FINAL IMPRESSION      1. Fall, initial encounter    2. Dysphagia, unspecified type    3. Low back pain, unspecified back pain laterality, unspecified chronicity, unspecified whether sciatica present    4. Hip pain, unspecified laterality          DISPOSITION/PLAN   DISPOSITION Decision To Discharge 01/08/2024 11:22:48 PM      OUTPATIENT FOLLOW UP THE PATIENT:  Flip Mckenzie MD  [address] [place] [id number]  [phone]    Schedule an appointment as soon as possible for a visit   As needed      Erica B Goldberger, MD Goldberger, Erica B, MD  01/08/24 0589

## 2024-01-09 NOTE — DISCHARGE INSTRUCTIONS
You can take Motrin as needed to help with pain.    I recommend follow-up with your primary care provider.  Please call or [facility] follow-up number [phone].    You do have degenerative changes in both the cervical spine and lumbar spine.  This may be causing issues with neuropathy and pain in your legs.

## 2024-01-09 NOTE — ED NOTES
Patient presents to ER from home due to Back pain, Neck Pain,Hand Injury and fall.Patient states her whole body hurts. Patient states she fell denies LOC or hitting head.Patient is A/O x 4, equal chest expansion with non labored breathing, wheels locked,  bed in lowest position, call light in reach.